# Patient Record
Sex: FEMALE | Race: WHITE | Employment: OTHER | URBAN - METROPOLITAN AREA
[De-identification: names, ages, dates, MRNs, and addresses within clinical notes are randomized per-mention and may not be internally consistent; named-entity substitution may affect disease eponyms.]

---

## 2019-12-06 ENCOUNTER — HOSPITAL ENCOUNTER (OUTPATIENT)
Age: 74
Setting detail: OBSERVATION
Discharge: HOME | End: 2019-12-06
Attending: EMERGENCY MEDICINE | Admitting: INTERNAL MEDICINE
Payer: MEDICARE

## 2019-12-06 ENCOUNTER — APPOINTMENT (OUTPATIENT)
Dept: CT IMAGING | Age: 74
End: 2019-12-06
Attending: EMERGENCY MEDICINE
Payer: MEDICARE

## 2019-12-06 ENCOUNTER — APPOINTMENT (OUTPATIENT)
Dept: GENERAL RADIOLOGY | Age: 74
End: 2019-12-06
Attending: EMERGENCY MEDICINE
Payer: MEDICARE

## 2019-12-06 VITALS
OXYGEN SATURATION: 99 % | TEMPERATURE: 97.9 F | HEART RATE: 76 BPM | RESPIRATION RATE: 16 BRPM | SYSTOLIC BLOOD PRESSURE: 147 MMHG | DIASTOLIC BLOOD PRESSURE: 64 MMHG

## 2019-12-06 DIAGNOSIS — F03.90 DEMENTIA WITHOUT BEHAVIORAL DISTURBANCE, UNSPECIFIED DEMENTIA TYPE: Primary | ICD-10-CM

## 2019-12-06 PROBLEM — R41.82 ALTERED MENTAL STATUS: Status: ACTIVE | Noted: 2019-12-06

## 2019-12-06 LAB
ALBUMIN SERPL-MCNC: 3.7 G/DL (ref 3.5–5)
ALBUMIN/GLOB SERPL: 1.3 {RATIO} (ref 1.1–2.2)
ALP SERPL-CCNC: 68 U/L (ref 45–117)
ALT SERPL-CCNC: 17 U/L (ref 12–78)
ANION GAP SERPL CALC-SCNC: 5 MMOL/L (ref 5–15)
APTT PPP: 25.1 SEC (ref 22.1–32)
AST SERPL-CCNC: 14 U/L (ref 15–37)
ATRIAL RATE: 67 BPM
BASOPHILS # BLD: 0 K/UL (ref 0–0.1)
BASOPHILS NFR BLD: 0 % (ref 0–1)
BILIRUB SERPL-MCNC: 0.8 MG/DL (ref 0.2–1)
BUN SERPL-MCNC: 16 MG/DL (ref 6–20)
BUN/CREAT SERPL: 20 (ref 12–20)
CALCIUM SERPL-MCNC: 9 MG/DL (ref 8.5–10.1)
CALCULATED P AXIS, ECG09: 20 DEGREES
CALCULATED R AXIS, ECG10: 51 DEGREES
CALCULATED T AXIS, ECG11: 45 DEGREES
CHLORIDE SERPL-SCNC: 109 MMOL/L (ref 97–108)
CO2 SERPL-SCNC: 28 MMOL/L (ref 21–32)
COMMENT, HOLDF: NORMAL
CREAT SERPL-MCNC: 0.79 MG/DL (ref 0.55–1.02)
DIAGNOSIS, 93000: NORMAL
DIFFERENTIAL METHOD BLD: NORMAL
EOSINOPHIL # BLD: 0 K/UL (ref 0–0.4)
EOSINOPHIL NFR BLD: 0 % (ref 0–7)
ERYTHROCYTE [DISTWIDTH] IN BLOOD BY AUTOMATED COUNT: 12.1 % (ref 11.5–14.5)
GLOBULIN SER CALC-MCNC: 2.8 G/DL (ref 2–4)
GLUCOSE SERPL-MCNC: 93 MG/DL (ref 65–100)
HCT VFR BLD AUTO: 38.1 % (ref 35–47)
HGB BLD-MCNC: 12.2 G/DL (ref 11.5–16)
IMM GRANULOCYTES # BLD AUTO: 0 K/UL (ref 0–0.04)
IMM GRANULOCYTES NFR BLD AUTO: 0 % (ref 0–0.5)
INR PPP: 1.1 (ref 0.9–1.1)
LYMPHOCYTES # BLD: 1.1 K/UL (ref 0.8–3.5)
LYMPHOCYTES NFR BLD: 20 % (ref 12–49)
MAGNESIUM SERPL-MCNC: 2.3 MG/DL (ref 1.6–2.4)
MCH RBC QN AUTO: 30 PG (ref 26–34)
MCHC RBC AUTO-ENTMCNC: 32 G/DL (ref 30–36.5)
MCV RBC AUTO: 93.8 FL (ref 80–99)
MONOCYTES # BLD: 0.3 K/UL (ref 0–1)
MONOCYTES NFR BLD: 6 % (ref 5–13)
NEUTS SEG # BLD: 3.9 K/UL (ref 1.8–8)
NEUTS SEG NFR BLD: 74 % (ref 32–75)
NRBC # BLD: 0 K/UL (ref 0–0.01)
NRBC BLD-RTO: 0 PER 100 WBC
P-R INTERVAL, ECG05: 144 MS
PLATELET # BLD AUTO: 166 K/UL (ref 150–400)
PMV BLD AUTO: 10.3 FL (ref 8.9–12.9)
POTASSIUM SERPL-SCNC: 3.4 MMOL/L (ref 3.5–5.1)
PROT SERPL-MCNC: 6.5 G/DL (ref 6.4–8.2)
PROTHROMBIN TIME: 11.2 SEC (ref 9–11.1)
Q-T INTERVAL, ECG07: 408 MS
QRS DURATION, ECG06: 86 MS
QTC CALCULATION (BEZET), ECG08: 431 MS
RBC # BLD AUTO: 4.06 M/UL (ref 3.8–5.2)
SAMPLES BEING HELD,HOLD: NORMAL
SODIUM SERPL-SCNC: 142 MMOL/L (ref 136–145)
THERAPEUTIC RANGE,PTTT: NORMAL SECS (ref 58–77)
VENTRICULAR RATE, ECG03: 67 BPM
WBC # BLD AUTO: 5.4 K/UL (ref 3.6–11)

## 2019-12-06 PROCEDURE — 36415 COLL VENOUS BLD VENIPUNCTURE: CPT

## 2019-12-06 PROCEDURE — 74011250637 HC RX REV CODE- 250/637: Performed by: INTERNAL MEDICINE

## 2019-12-06 PROCEDURE — 85610 PROTHROMBIN TIME: CPT

## 2019-12-06 PROCEDURE — 85025 COMPLETE CBC W/AUTO DIFF WBC: CPT

## 2019-12-06 PROCEDURE — 99285 EMERGENCY DEPT VISIT HI MDM: CPT

## 2019-12-06 PROCEDURE — 80053 COMPREHEN METABOLIC PANEL: CPT

## 2019-12-06 PROCEDURE — 93005 ELECTROCARDIOGRAM TRACING: CPT

## 2019-12-06 PROCEDURE — 85730 THROMBOPLASTIN TIME PARTIAL: CPT

## 2019-12-06 PROCEDURE — 70450 CT HEAD/BRAIN W/O DYE: CPT

## 2019-12-06 PROCEDURE — 99218 HC RM OBSERVATION: CPT

## 2019-12-06 PROCEDURE — 71045 X-RAY EXAM CHEST 1 VIEW: CPT

## 2019-12-06 PROCEDURE — 83735 ASSAY OF MAGNESIUM: CPT

## 2019-12-06 RX ORDER — POTASSIUM CHLORIDE 750 MG/1
40 TABLET, FILM COATED, EXTENDED RELEASE ORAL
Status: COMPLETED | OUTPATIENT
Start: 2019-12-06 | End: 2019-12-06

## 2019-12-06 RX ORDER — AMLODIPINE BESYLATE 5 MG/1
5 TABLET ORAL DAILY
COMMUNITY
End: 2020-03-02

## 2019-12-06 RX ORDER — HYDROCHLOROTHIAZIDE 12.5 MG/1
12.5 TABLET ORAL DAILY
COMMUNITY
End: 2020-03-02

## 2019-12-06 RX ADMIN — POTASSIUM CHLORIDE 40 MEQ: 750 TABLET, FILM COATED, EXTENDED RELEASE ORAL at 17:16

## 2019-12-06 NOTE — ED TRIAGE NOTES
Per EMS pt was at [facility] and forgot her wallet. Pt went to car the get wallet and returned with a pair of gloves in attempt to pay . Staff at [facility] reported to EMS that pt is a regular in the store and they have noticed a decline in her mental status over the past 2 weeks.

## 2019-12-06 NOTE — H&P
[address] [facility] Adult  Hospitalist Group  Hospitalist Consult    Primary Care Provider: Other, MD Lucas    Subjective:     Ms. Nghia Klein is a 69y sweet lady with pertinent medical history of hypertension only, taking 2 medications per her POA, Ms. Elizabeth Sun, who lives in the very same apartment complex as patient. Patient is alert, awake but completely confused. He is able to carry out a conversation in a non-meaningful way. Would answer the question in a nonhelpful way. Patient has baseline dementia and has a lot of support from neighborhood. At baseline patient is able to walk independently. She walks to [employer] regularly which is very close to her house, and does her groceries by herself. Patient does not drive for the last 8-4/1-TARU due to field test with her cataracts. Patient gets confused easily if she is in unfamiliar environment as per the POA. Today patient was at [facility]. When she went to check out counter, the regular person was on break. Patient went to self checkout but then another person helping her over there when asked about money, patient handed her gloves. Then patient went back to her apartment to get her wallet, but according to [facility] staff, it was a basket and a hat. To the regular staff, she seemed more confused so they called EMS who brought patient to emergency department for further evaluation. In the ED patient has been evaluated with previous diagnostic tests. Her CBC is within normal limits. K is 3.4 but rest of the CMP is within normal limits. CT head without contrast and x-ray chest also are unremarkable. UA is pending at present. ED physician asked us to evaluate the patient for further recommendation and consideration for admission. POA, she has noticed gradual worsening of her confusion over a period of 2-4 weeks more so pronounced than before. This is started about at the beginning of fallSeptember.   Since then patient has lost about 10-15 pounds weight as well. Her appetite is okay but diet is reduced. No chest pain, shortness of breath, dizziness, lightheadedness, palpitation, abdominal pain, abdominal distention, nausea, vomiting, leg swelling, orthopnea, PND, focal neurological deficit or any other symptoms. No blood in stool or urine. Pt is not able to contribute to her history details as any points. Review of Systems:    A comprehensive review of systems was negative except for that written in the History of Present Illness. No past medical history on file. Per POA, HTN and Dementia only  No past surgical history on file. Prior to Admission medications    Not on File   Takes 2 medications for her HTN. But POA does not have the list of meds. Allergies no known allergies   No family history on file. POA denied any cardiac history to her knowledge. Pt is confused and not able to contribute any history details. SOCIAL HISTORY:  Patient resides at Home. Patient ambulates with no supportindependently. Smoking history: never  Alcohol history: None      Objective:       Physical Exam:   Visit Vitals  /64   Pulse 76   Temp 97.9 °F (36.6 °C)   Resp 16   SpO2 99%     General:  Alert, cooperative, no distress, appears stated age. Baseline dementia. Thin cachectic habitus   Head:  Normocephalic, without obvious abnormality, atraumatic. Eyes:  Conjunctivae/corneas clear. PERRL, EOMs intact. Ears:  Normal TMs and external ear canals both ears. Nose: Nares normal. Septum midline. Mucosa normal. No drainage or sinus tenderness. Throat: Lips, mucosa, and tongue normal. Teeth and gums normal.   Neck: Supple, symmetrical, trachea midline, no adenopathy, thyroid: no enlargement/tenderness/nodules, no carotid bruit and no JVD. Back:   Symmetric, no curvature. ROM normal. No CVA tenderness. Lungs:   Clear to auscultation bilaterally. Heart:  Regular rate and rhythm, S1, S2 normal, no murmur, click, rub or gallop.    Abdomen: Soft, non-tender. Bowel sounds normal. No masses,  No organomegaly. Extremities: Extremities normal, atraumatic, no cyanosis or edema. Pulses: 2+ and symmetric all extremities. Skin: Skin color, texture, turgor normal. No rashes or lesions. Lymph nodes: Cervical, supraclavicular, and axillary nodes normal.   Neurologic: CNII-XII intact. Normal strength, sensation and reflexes throughout. Pt is alert, awake, confused completely. Oriented to self but not to time, place, persons. Recognizes POA but could not tell me her name. No focal neurological deficits on my limited exam given limited cooperation from this pleasant patient. ECG:  normal EKG, normal sinus rhythm, unchanged from previous tracings     Data Review: All diagnostic labs and studies have been reviewed. Chest x-ray was negative for infiltrate, effusion, pneumothorax, or wide mediastinum. Ct Head Wo Cont    Result Date: 12/6/2019  IMPRESSION: Mild small vessel ischemic changes. No acute abnormality. Xr Chest Port    Result Date: 12/6/2019  IMPRESSION: No acute cardiopulmonary disease. Recent Results (from the past 24 hour(s))   CBC WITH AUTOMATED DIFF    Collection Time: 12/06/19 12:33 PM   Result Value Ref Range    WBC 5.4 3.6 - 11.0 K/uL    RBC 4.06 3.80 - 5.20 M/uL    HGB 12.2 11.5 - 16.0 g/dL    HCT 38.1 35.0 - 47.0 %    MCV 93.8 80.0 - 99.0 FL    MCH 30.0 26.0 - 34.0 PG    MCHC 32.0 30.0 - 36.5 g/dL    RDW 12.1 11.5 - 14.5 %    PLATELET 096 307 - 778 K/uL    MPV 10.3 8.9 - 12.9 FL    NRBC 0.0 0  WBC    ABSOLUTE NRBC 0.00 0.00 - 0.01 K/uL    NEUTROPHILS 74 32 - 75 %    LYMPHOCYTES 20 12 - 49 %    MONOCYTES 6 5 - 13 %    EOSINOPHILS 0 0 - 7 %    BASOPHILS 0 0 - 1 %    IMMATURE GRANULOCYTES 0 0.0 - 0.5 %    ABS. NEUTROPHILS 3.9 1.8 - 8.0 K/UL    ABS. LYMPHOCYTES 1.1 0.8 - 3.5 K/UL    ABS. MONOCYTES 0.3 0.0 - 1.0 K/UL    ABS. EOSINOPHILS 0.0 0.0 - 0.4 K/UL    ABS. BASOPHILS 0.0 0.0 - 0.1 K/UL    ABS. IMM.  GRANS. 0.0 0.00 - 0.04 K/UL    DF AUTOMATED     METABOLIC PANEL, COMPREHENSIVE    Collection Time: 12/06/19 12:33 PM   Result Value Ref Range    Sodium 142 136 - 145 mmol/L    Potassium 3.4 (L) 3.5 - 5.1 mmol/L    Chloride 109 (H) 97 - 108 mmol/L    CO2 28 21 - 32 mmol/L    Anion gap 5 5 - 15 mmol/L    Glucose 93 65 - 100 mg/dL    BUN 16 6 - 20 MG/DL    Creatinine 0.79 0.55 - 1.02 MG/DL    BUN/Creatinine ratio 20 12 - 20      GFR est AA >60 >60 ml/min/1.73m2    GFR est non-AA >60 >60 ml/min/1.73m2    Calcium 9.0 8.5 - 10.1 MG/DL    Bilirubin, total 0.8 0.2 - 1.0 MG/DL    ALT (SGPT) 17 12 - 78 U/L    AST (SGOT) 14 (L) 15 - 37 U/L    Alk. phosphatase 68 45 - 117 U/L    Protein, total 6.5 6.4 - 8.2 g/dL    Albumin 3.7 3.5 - 5.0 g/dL    Globulin 2.8 2.0 - 4.0 g/dL    A-G Ratio 1.3 1.1 - 2.2     PROTHROMBIN TIME + INR    Collection Time: 12/06/19 12:33 PM   Result Value Ref Range    INR 1.1 0.9 - 1.1      Prothrombin time 11.2 (H) 9.0 - 11.1 sec   PTT    Collection Time: 12/06/19 12:33 PM   Result Value Ref Range    aPTT 25.1 22.1 - 32.0 sec    aPTT, therapeutic range     58.0 - 77.0 SECS   SAMPLES BEING HELD    Collection Time: 12/06/19 12:33 PM   Result Value Ref Range    SAMPLES BEING HELD 1RD     COMMENT        Add-on orders for these samples will be processed based on acceptable specimen integrity and analyte stability, which may vary by analyte. EKG, 12 LEAD, INITIAL    Collection Time: 12/06/19 12:39 PM   Result Value Ref Range    Ventricular Rate 67 BPM    Atrial Rate 67 BPM    P-R Interval 144 ms    QRS Duration 86 ms    Q-T Interval 408 ms    QTC Calculation (Bezet) 431 ms    Calculated P Axis 20 degrees    Calculated R Axis 51 degrees    Calculated T Axis 45 degrees    Diagnosis Normal sinus rhythm  No previous ECGs available        Assessment:     Active Problems:    Altered mental status (12/6/2019)    # Altered mental status. Gradual worsening of dementia mostly.  In light of her normal work up, at present, I would recommend to get Senior services consult in the ED to assist further with care of this pt. - Pt would benefit from outpatient neurology evaluation and consideration for medication for dementia, other supportive care and placement to memory care unit/ assisted living/ nursing home. Given she is mobile and independent physically, assisted living could be a possible option.  - On discharge from ED, I would recommend to get PCP f/u on Monday to  and follow up on recommendations that can be provided to the patient during this evaluation.  - If for patient safety reasons, or with pending UA suggesting UTI or if anything changes, requiring patient to be in the hospital under observation, feel free to contact me to assist further with the care of this pt. - If hospitalized, I would consider USD, Ammonia level, Neurology consultation and PT/OT eval based on further clinical reassessment and needs. # HTN: Vitals stable currently. Pt has not taken her AM meds per POA. Pharmacy assistance to verify her medications. # Severe protein calorie malnutrition: I would recommend to get outpatient nutrition consult along with nutritional shakes supplementations 3 times daily with meals. If hospitalized, will consult nutrition team.    # Hypokalemia: Recommend to replenish potassium with KCl 40 M EQ p.o. once in the ED and add on magnesium level. Thank you for allowing me to be part of care of this wonderful patient, Ms. Cartwright. Feel free to contact me if any concerns or questions. Plan:     As noted above    FUNCTIONAL STATUS PRIOR TO HOSPITALIZATION (including history of recent falls):  Independent from Home     Signed By: Leodan Romero MD     December 6, 2019

## 2019-12-06 NOTE — PROGRESS NOTES
Admission Medication Reconciliation:    Information obtained from:  Family member, [facility] pharmacy    Comments/Recommendations: Reviewed PTA medications and patient's allergies. Patient currently altered so unable to provide medication history. Per family member present, she is \"supposed to be taking\" two blood pressure medications. Called [facility] pharmacy to obtain information. Added amlodipine, HCTZ to PTA med list.   [facility] benefit data reflects medications filled and processed through the patient's insurance, however   this data does NOT capture whether the medication was picked up or is currently being taken by the patient. Allergies:  Patient has no known allergies. Significant PMH/Disease States: No past medical history on file. Chief Complaint for this Admission:    Chief Complaint   Patient presents with    Altered mental status     Prior to Admission Medications:   Prior to Admission Medications   Prescriptions Last Dose Informant Patient Reported? Taking? amLODIPine (NORVASC) 5 mg tablet   Yes Yes   Sig: Take 5 mg by mouth daily. hydroCHLOROthiazide (HYDRODIURIL) 12.5 mg tablet   Yes Yes   Sig: Take 12.5 mg by mouth daily.       Facility-Administered Medications: None

## 2019-12-06 NOTE — PROGRESS NOTES
SSED/CM consult received and appreciated. SBAR received from [facility] and case discussed w/ Jenny Bateman. CMs met w/patient and POA Zabrina Shea - both have know each other for 2 years. Ms. Stacy Patterson lives 3 buildings from Ms. Gretchen Hernandez and has been POA for 1 month. POA has attempted to reach out to patient's sister in [place] however has not been active w/ planning processes. CM discussed Certified Senior Advisors programs including [facility], [address] [place] [address] for Mom and [facility]. POA receptive and in agreement with plan for patient to return back home w/ POA and community support (patient has a network of friends and apartment management) who call and visit regularly. Informed POA of the need to supplement w/ private duty care and list provided.  to travel out of town tomorrow and will have supports in place. Informed Ms. Gretchen Hernandez has [employer]" her [place] . Patient is pleasant and conversational. [place] expressed appreciation for visit today. No additional transitional care needs verbalized. Updates provided to Jenny Bateman and Markham Castleman. Transportation home by [place].

## 2019-12-06 NOTE — PROGRESS NOTES
Observation notice provided in writing to patient and/or caregiver as well as verbal explanation of the policy. Patients who are in outpatient status also receive the Observation notice. Osiel Gracia was also given and signed by [place]. Reason for Admission:   Decline in Mental Status                   RRAT Score:          4/ low           Plan for utilizing home health:      TBD/ CM will need to follow                    Current Advanced Directive/Advance Care Plan: not on file                         Transition of Care Plan: This CM went in to give [place] and OBS notice. Per POA Soren Parikh [phone] patient live alone in an apartment and over the past week has had significant decline in mental status. This CM was not able to ask patient any questions to patient and have patient respond appropriately. Per POA, patient has a Trust that she can access through patient's . Patient receives 800 dollars a month and her trust pays for her other living expenses. Patient's current address is [address] [place] [address]. Per POA, she checks on patient daily and also transports her to appointments. POA also mentioned that patient had a fall in September of 2019 that was evaluated at [facility] to follow up with PCP. Senior Services is to consult. PO is willing to take patient home and provide support, but feels patient may need placement at this point. CM will need to follow. Care Management Interventions  PCP Verified by CM:  Yes  Palliative Care Criteria Met (RRAT>21 & CHF Dx)?: No  Transition of Care Consult (CM Consult): (TBD)  MyChart Signup: No(pending)  Discharge Durable Medical Equipment: No  Health Maintenance Reviewed: Yes  Physical Therapy Consult: No  Occupational Therapy Consult: No  Speech Therapy Consult: No  Current Support Network: (lives in an apartment)  Confirm Follow Up Transport: Friends  Plan discussed with Pt/Family/Caregiver: Yes  Discharge Location  Discharge Placement: (TBD)  Pauly Dao  3:36 PM

## 2019-12-06 NOTE — ED PROVIDER NOTES
76 y.o. female with no significant past medical history who presents from [facility] via EMS with chief complaint of AMS. Pt presents from [facility] via EMS due to staff reports of altered mental status. Per EMS, the pt was grocery shopping and trying to pay, but stated she forgot her wallet in the car. Staff stated the pt returned to the check out area with a basket and gloves to pay with. Staff told EMS that they have a noticed a gradual decline in mental status over the past 2 weeks. Pt states she ambulates without assistance at baseline. Pt is not sure where she lives. Pt denies pain, fever, chills, shortness of breath, headache, nausea, vomiting, abdominal pain, constipation, or dysuria. There are no other acute medical concerns at this time. Full history, physical exam, and ROS unable to be obtained due to:  confusion. Note written by Anjelica Starkey. Debby Jaquez, as dictated by Des Zhong MD 11:05 AM      The history is provided by the patient and the EMS personnel. No past medical history on file. No past surgical history on file. No family history on file.     Social History     Socioeconomic History    Marital status:      Spouse name: Not on file    Number of children: Not on file    Years of education: Not on file    Highest education level: Not on file   Occupational History    Not on file   Social Needs    Financial resource strain: Not on file    Food insecurity:     Worry: Not on file     Inability: Not on file    Transportation needs:     Medical: Not on file     Non-medical: Not on file   Tobacco Use    Smoking status: Not on file   Substance and Sexual Activity    Alcohol use: Not on file    Drug use: Not on file    Sexual activity: Not on file   Lifestyle    Physical activity:     Days per week: Not on file     Minutes per session: Not on file    Stress: Not on file   Relationships    Social connections:     Talks on phone: Not on file     Gets together: Not on file     Attends Catholic service: Not on file     Active member of club or organization: Not on file     Attends meetings of clubs or organizations: Not on file     Relationship status: Not on file    Intimate partner violence:     Fear of current or ex partner: Not on file     Emotionally abused: Not on file     Physically abused: Not on file     Forced sexual activity: Not on file   Other Topics Concern    Not on file   Social History Narrative    Not on file         ALLERGIES: Patient has no known allergies. Review of Systems   Unable to perform ROS: Mental status change       Vitals:    12/06/19 1104   BP: 138/77   Pulse: 76   Resp: 16   Temp: 97.9 °F (36.6 °C)   SpO2: 98%            Physical Exam  Vitals signs and nursing note reviewed. Constitutional:       General: She is not in acute distress. Appearance: She is well-developed. HENT:      Head: Normocephalic and atraumatic. Nose: Nose normal.   Eyes:      General: No scleral icterus. Conjunctiva/sclera: Conjunctivae normal.      Pupils: Pupils are equal, round, and reactive to light. Neck:      Musculoskeletal: Normal range of motion and neck supple. Thyroid: No thyromegaly. Vascular: No JVD. Trachea: No tracheal deviation. Comments: No carotid bruits noted. Cardiovascular:      Rate and Rhythm: Normal rate and regular rhythm. Heart sounds: Normal heart sounds. No murmur. No friction rub. No gallop. Pulmonary:      Effort: Pulmonary effort is normal. No respiratory distress. Breath sounds: Normal breath sounds. No wheezing or rales. Chest:      Chest wall: No tenderness. Abdominal:      General: Bowel sounds are normal. There is no distension. Palpations: Abdomen is soft. There is no mass. Tenderness: There is no tenderness. There is no guarding or rebound. Musculoskeletal: Normal range of motion. General: No tenderness.    Lymphadenopathy:      Cervical: No cervical adenopathy. Skin:     General: Skin is warm and dry. Findings: No erythema or rash. Neurological:      General: No focal deficit present. Mental Status: She is alert. Cranial Nerves: No cranial nerve deficit. Coordination: Coordination normal.      Deep Tendon Reflexes: Reflexes are normal and symmetric. Comments: Confused. Does not know the year. Does not know where she lives. Global expressive aphasia. Psychiatric:      Comments: Unable to determine as patient is confused/demented. Note written by Galileo Honeycutt. Karsten Leventhal, as dictated by Gilbert Joe MD 11:08 AM      MDM  Number of Diagnoses or Management Options     Amount and/or Complexity of Data Reviewed  Clinical lab tests: ordered and reviewed  Tests in the radiology section of CPT®: ordered and reviewed  Decide to obtain previous medical records or to obtain history from someone other than the patient: yes  Review and summarize past medical records: yes  Discuss the patient with other providers: yes  Independent visualization of images, tracings, or specimens: yes    Risk of Complications, Morbidity, and/or Mortality  Presenting problems: high  Diagnostic procedures: high  Management options: high    Critical Care  Total time providing critical care: 30-74 minutes         Procedures    ED MD EKG interpretation : NSR with rate 67 BPM. No ectopy noted. Axis is normal. Intervals are normal. No ectopy or acute changes noted. Penny Schultz MD      Awaiting labs 12:52 PM     1:23 PM   The patient's lab and scan are unremarkable. She will require admission for further evaluation of either strokelike syndrome or dementia. Consult the hospitalist for admission. She is not safe to return home at this time.     Hospitalist Segundo Text for Admission  1:25 PM    ED Room Number: ER05/05  Patient Name and age:  Kyara Wilson 76 y.o.  female  Working Diagnosis: Altered mental status  Readmission: no  Isolation Requirements:  no  Recommended Level of Care:  med/surg  Code Status:  full  Other:    Department:[facility] - [facility]     The hospitalist is seen the patient and wants to hold off on admission until she has a value based management. I have expressed my concern that she is not in any shape to go home by herself. She does not know the day or the year or even where she is. Power of  lives in a separate apartment. Case management is asked to for appropriateness of discharge. Case management is worked with the power of . She will assure that someone is with the patient during the course of the weekend. They are working on the potential placement of this patient and a nursing home. It is felt that with the assurances of the power of , it is reasonable to let this patient go home into a more control safe environment until such a time as they can get her placed. The patient has been up and ambulatory in the ED and in no acute distress.

## 2020-02-27 ENCOUNTER — APPOINTMENT (OUTPATIENT)
Dept: NON INVASIVE DIAGNOSTICS | Age: 75
DRG: 689 | End: 2020-02-27
Attending: INTERNAL MEDICINE
Payer: MEDICARE

## 2020-02-27 ENCOUNTER — APPOINTMENT (OUTPATIENT)
Dept: CT IMAGING | Age: 75
DRG: 689 | End: 2020-02-27
Attending: EMERGENCY MEDICINE
Payer: MEDICARE

## 2020-02-27 ENCOUNTER — HOSPITAL ENCOUNTER (INPATIENT)
Age: 75
LOS: 4 days | Discharge: NURSING, REHAB OR LONG-TERM CARE | DRG: 689 | End: 2020-03-02
Attending: EMERGENCY MEDICINE | Admitting: INTERNAL MEDICINE
Payer: MEDICARE

## 2020-02-27 DIAGNOSIS — I63.9 CEREBROVASCULAR ACCIDENT (CVA), UNSPECIFIED MECHANISM (HCC): Primary | ICD-10-CM

## 2020-02-27 DIAGNOSIS — R41.82 ALTERED MENTAL STATUS, UNSPECIFIED ALTERED MENTAL STATUS TYPE: ICD-10-CM

## 2020-02-27 LAB
ALBUMIN SERPL-MCNC: 3.4 G/DL (ref 3.5–5)
ALBUMIN/GLOB SERPL: 1 {RATIO} (ref 1.1–2.2)
ALP SERPL-CCNC: 79 U/L (ref 45–117)
ALT SERPL-CCNC: 21 U/L (ref 12–78)
ANION GAP SERPL CALC-SCNC: 7 MMOL/L (ref 5–15)
APPEARANCE UR: CLEAR
APTT PPP: 25 SEC (ref 22.1–32)
AST SERPL-CCNC: 23 U/L (ref 15–37)
BACTERIA URNS QL MICRO: NEGATIVE /HPF
BASOPHILS # BLD: 0 K/UL (ref 0–0.1)
BASOPHILS NFR BLD: 0 % (ref 0–1)
BILIRUB SERPL-MCNC: 0.9 MG/DL (ref 0.2–1)
BILIRUB UR QL: NEGATIVE
BUN SERPL-MCNC: 14 MG/DL (ref 6–20)
BUN/CREAT SERPL: 22 (ref 12–20)
CALCIUM SERPL-MCNC: 8.1 MG/DL (ref 8.5–10.1)
CHLORIDE SERPL-SCNC: 96 MMOL/L (ref 97–108)
CO2 SERPL-SCNC: 26 MMOL/L (ref 21–32)
COLOR UR: ABNORMAL
COMMENT, HOLDF: NORMAL
CREAT SERPL-MCNC: 0.63 MG/DL (ref 0.55–1.02)
DIFFERENTIAL METHOD BLD: ABNORMAL
EOSINOPHIL # BLD: 0 K/UL (ref 0–0.4)
EOSINOPHIL NFR BLD: 0 % (ref 0–7)
EPITH CASTS URNS QL MICRO: ABNORMAL /LPF
ERYTHROCYTE [DISTWIDTH] IN BLOOD BY AUTOMATED COUNT: 11.9 % (ref 11.5–14.5)
GLOBULIN SER CALC-MCNC: 3.3 G/DL (ref 2–4)
GLUCOSE BLD STRIP.AUTO-MCNC: 94 MG/DL (ref 65–100)
GLUCOSE SERPL-MCNC: 102 MG/DL (ref 65–100)
GLUCOSE UR STRIP.AUTO-MCNC: NEGATIVE MG/DL
HCT VFR BLD AUTO: 38.4 % (ref 35–47)
HGB BLD-MCNC: 13 G/DL (ref 11.5–16)
HGB UR QL STRIP: ABNORMAL
HYALINE CASTS URNS QL MICRO: ABNORMAL /LPF (ref 0–5)
IMM GRANULOCYTES # BLD AUTO: 0 K/UL (ref 0–0.04)
IMM GRANULOCYTES NFR BLD AUTO: 0 % (ref 0–0.5)
INR PPP: 1.1 (ref 0.9–1.1)
KETONES UR QL STRIP.AUTO: 15 MG/DL
LEUKOCYTE ESTERASE UR QL STRIP.AUTO: NEGATIVE
LYMPHOCYTES # BLD: 0.8 K/UL (ref 0.8–3.5)
LYMPHOCYTES NFR BLD: 15 % (ref 12–49)
MCH RBC QN AUTO: 30.8 PG (ref 26–34)
MCHC RBC AUTO-ENTMCNC: 33.9 G/DL (ref 30–36.5)
MCV RBC AUTO: 91 FL (ref 80–99)
MONOCYTES # BLD: 0.5 K/UL (ref 0–1)
MONOCYTES NFR BLD: 9 % (ref 5–13)
NEUTS SEG # BLD: 4.1 K/UL (ref 1.8–8)
NEUTS SEG NFR BLD: 76 % (ref 32–75)
NITRITE UR QL STRIP.AUTO: NEGATIVE
NRBC # BLD: 0 K/UL (ref 0–0.01)
NRBC BLD-RTO: 0 PER 100 WBC
PH UR STRIP: 6.5 [PH] (ref 5–8)
PLATELET # BLD AUTO: 162 K/UL (ref 150–400)
PMV BLD AUTO: 10.2 FL (ref 8.9–12.9)
POTASSIUM SERPL-SCNC: 3.3 MMOL/L (ref 3.5–5.1)
PROT SERPL-MCNC: 6.7 G/DL (ref 6.4–8.2)
PROT UR STRIP-MCNC: NEGATIVE MG/DL
PROTHROMBIN TIME: 11.4 SEC (ref 9–11.1)
RBC # BLD AUTO: 4.22 M/UL (ref 3.8–5.2)
RBC #/AREA URNS HPF: ABNORMAL /HPF (ref 0–5)
RBC MORPH BLD: ABNORMAL
SAMPLES BEING HELD,HOLD: NORMAL
SERVICE CMNT-IMP: NORMAL
SODIUM SERPL-SCNC: 129 MMOL/L (ref 136–145)
SP GR UR REFRACTOMETRY: 1.02 (ref 1–1.03)
THERAPEUTIC RANGE,PTTT: NORMAL SECS (ref 58–77)
TROPONIN I SERPL-MCNC: <0.05 NG/ML
TROPONIN I SERPL-MCNC: <0.05 NG/ML
UROBILINOGEN UR QL STRIP.AUTO: 0.2 EU/DL (ref 0.2–1)
WBC # BLD AUTO: 5.4 K/UL (ref 3.6–11)
WBC URNS QL MICRO: ABNORMAL /HPF (ref 0–4)

## 2020-02-27 PROCEDURE — 82962 GLUCOSE BLOOD TEST: CPT

## 2020-02-27 PROCEDURE — 0042T CT CODE NEURO PERF W CBF: CPT

## 2020-02-27 PROCEDURE — 84484 ASSAY OF TROPONIN QUANT: CPT

## 2020-02-27 PROCEDURE — 36415 COLL VENOUS BLD VENIPUNCTURE: CPT

## 2020-02-27 PROCEDURE — 74011250636 HC RX REV CODE- 250/636: Performed by: INTERNAL MEDICINE

## 2020-02-27 PROCEDURE — 85025 COMPLETE CBC W/AUTO DIFF WBC: CPT

## 2020-02-27 PROCEDURE — 93005 ELECTROCARDIOGRAM TRACING: CPT

## 2020-02-27 PROCEDURE — 80053 COMPREHEN METABOLIC PANEL: CPT

## 2020-02-27 PROCEDURE — 81001 URINALYSIS AUTO W/SCOPE: CPT

## 2020-02-27 PROCEDURE — 65660000000 HC RM CCU STEPDOWN

## 2020-02-27 PROCEDURE — 74011000258 HC RX REV CODE- 258: Performed by: RADIOLOGY

## 2020-02-27 PROCEDURE — 85730 THROMBOPLASTIN TIME PARTIAL: CPT

## 2020-02-27 PROCEDURE — 85610 PROTHROMBIN TIME: CPT

## 2020-02-27 PROCEDURE — 74011250637 HC RX REV CODE- 250/637: Performed by: INTERNAL MEDICINE

## 2020-02-27 PROCEDURE — C8929 TTE W OR WO FOL WCON,DOPPLER: HCPCS

## 2020-02-27 PROCEDURE — 70496 CT ANGIOGRAPHY HEAD: CPT

## 2020-02-27 PROCEDURE — 74011636320 HC RX REV CODE- 636/320: Performed by: RADIOLOGY

## 2020-02-27 PROCEDURE — 77030019905 HC CATH URETH INTMIT MDII -A

## 2020-02-27 PROCEDURE — 70450 CT HEAD/BRAIN W/O DYE: CPT

## 2020-02-27 PROCEDURE — 99285 EMERGENCY DEPT VISIT HI MDM: CPT

## 2020-02-27 RX ORDER — POTASSIUM CHLORIDE 7.45 MG/ML
10 INJECTION INTRAVENOUS
Status: DISCONTINUED | OUTPATIENT
Start: 2020-02-27 | End: 2020-02-27

## 2020-02-27 RX ORDER — SODIUM CHLORIDE 9 MG/ML
75 INJECTION, SOLUTION INTRAVENOUS CONTINUOUS
Status: DISPENSED | OUTPATIENT
Start: 2020-02-27 | End: 2020-02-28

## 2020-02-27 RX ORDER — SODIUM CHLORIDE 0.9 % (FLUSH) 0.9 %
5-40 SYRINGE (ML) INJECTION EVERY 8 HOURS
Status: DISCONTINUED | OUTPATIENT
Start: 2020-02-27 | End: 2020-03-02 | Stop reason: HOSPADM

## 2020-02-27 RX ORDER — HEPARIN SODIUM 5000 [USP'U]/ML
5000 INJECTION, SOLUTION INTRAVENOUS; SUBCUTANEOUS EVERY 8 HOURS
Status: DISCONTINUED | OUTPATIENT
Start: 2020-02-27 | End: 2020-03-02 | Stop reason: HOSPADM

## 2020-02-27 RX ORDER — SODIUM CHLORIDE 0.9 % (FLUSH) 0.9 %
5-40 SYRINGE (ML) INJECTION AS NEEDED
Status: DISCONTINUED | OUTPATIENT
Start: 2020-02-27 | End: 2020-03-02 | Stop reason: HOSPADM

## 2020-02-27 RX ORDER — HALOPERIDOL 5 MG/ML
2 INJECTION INTRAMUSCULAR
Status: COMPLETED | OUTPATIENT
Start: 2020-02-27 | End: 2020-02-27

## 2020-02-27 RX ORDER — ONDANSETRON 2 MG/ML
4 INJECTION INTRAMUSCULAR; INTRAVENOUS
Status: DISCONTINUED | OUTPATIENT
Start: 2020-02-27 | End: 2020-03-02 | Stop reason: HOSPADM

## 2020-02-27 RX ORDER — SODIUM CHLORIDE 0.9 % (FLUSH) 0.9 %
10 SYRINGE (ML) INJECTION
Status: COMPLETED | OUTPATIENT
Start: 2020-02-27 | End: 2020-02-27

## 2020-02-27 RX ORDER — ACETAMINOPHEN 325 MG/1
650 TABLET ORAL
Status: DISCONTINUED | OUTPATIENT
Start: 2020-02-27 | End: 2020-03-02 | Stop reason: HOSPADM

## 2020-02-27 RX ORDER — DOCUSATE SODIUM 100 MG/1
100 CAPSULE, LIQUID FILLED ORAL 2 TIMES DAILY
Status: DISCONTINUED | OUTPATIENT
Start: 2020-02-27 | End: 2020-03-02 | Stop reason: HOSPADM

## 2020-02-27 RX ADMIN — Medication 10 ML: at 14:39

## 2020-02-27 RX ADMIN — HALOPERIDOL LACTATE 2 MG: 5 INJECTION INTRAMUSCULAR at 19:27

## 2020-02-27 RX ADMIN — Medication 10 ML: at 22:00

## 2020-02-27 RX ADMIN — SODIUM CHLORIDE 100 ML: 900 INJECTION, SOLUTION INTRAVENOUS at 14:39

## 2020-02-27 RX ADMIN — POTASSIUM BICARBONATE 40 MEQ: 782 TABLET, EFFERVESCENT ORAL at 19:22

## 2020-02-27 RX ADMIN — HEPARIN SODIUM 5000 UNITS: 5000 INJECTION INTRAVENOUS; SUBCUTANEOUS at 17:18

## 2020-02-27 RX ADMIN — IOPAMIDOL 120 ML: 755 INJECTION, SOLUTION INTRAVENOUS at 14:39

## 2020-02-27 RX ADMIN — SODIUM CHLORIDE 75 ML/HR: 900 INJECTION, SOLUTION INTRAVENOUS at 20:39

## 2020-02-27 NOTE — ED NOTES
Pt found sitting on floor. Pt had no complaints. ER MD at bedside to evaluate pt. Hospitalist was paged.

## 2020-02-27 NOTE — ED NOTES
Bedside and Verbal shift change report given to [place] (oncoming nurse) by Matt Horowitz (offgoing nurse). Report included the following information SBAR, ED Summary, MAR and Recent Results. 1822: Dysphagia screening completed, pt able to tolerate applesauce. Per Meme Shay pt can take potassium PO with applesauce.

## 2020-02-27 NOTE — ED PROVIDER NOTES
This is a 57-year-old female with a history of dementia and lives in a home. She apparently fell around 8:00 and was last known well about 745 this morning. The staff and said that the patient normally is confused and does not make a lot of sense. They felt that after they found her on the floor she was a little more confused than her baseline which is bad past.  There was no overt evidence of any injury as result of the fall. EMS was called to bring the patient to the hospital for further evaluation. No other history is available from the home. The patient is not in any condition to provide any history. No past medical history on file. No past surgical history on file. No family history on file.     Social History     Socioeconomic History    Marital status:      Spouse name: Not on file    Number of children: Not on file    Years of education: Not on file    Highest education level: Not on file   Occupational History    Not on file   Social Needs    Financial resource strain: Not on file    Food insecurity:     Worry: Not on file     Inability: Not on file    Transportation needs:     Medical: Not on file     Non-medical: Not on file   Tobacco Use    Smoking status: Not on file   Substance and Sexual Activity    Alcohol use: Not on file    Drug use: Not on file    Sexual activity: Not on file   Lifestyle    Physical activity:     Days per week: Not on file     Minutes per session: Not on file    Stress: Not on file   Relationships    Social connections:     Talks on phone: Not on file     Gets together: Not on file     Attends Mandaen service: Not on file     Active member of club or organization: Not on file     Attends meetings of clubs or organizations: Not on file     Relationship status: Not on file    Intimate partner violence:     Fear of current or ex partner: Not on file     Emotionally abused: Not on file     Physically abused: Not on file     Forced sexual activity: Not on file   Other Topics Concern    Not on file   Social History Narrative    Not on file         ALLERGIES: Patient has no known allergies. Review of Systems   Unable to perform ROS: Mental status change   Skin: Negative for color change and rash. All other systems reviewed and are negative. Vitals:    02/27/20 1322   BP: 151/66   Pulse: 74   Resp: 17   Temp: 98.6 °F (37 °C)   SpO2: 96%   Weight: 46.5 kg (102 lb 8.2 oz)            Physical Exam  Vitals signs and nursing note reviewed. Constitutional:       General: She is in acute distress. Appearance: She is well-developed. She is ill-appearing. Comments: Patient has some garbled speech and not making sense. VS as noted. Moving all extremities, although right arm a bit ataxic. VS as noted. No overt signs of trauma. HENT:      Head: Normocephalic and atraumatic. Nose: Nose normal.   Eyes:      General: No scleral icterus. Conjunctiva/sclera: Conjunctivae normal.      Pupils: Pupils are equal, round, and reactive to light. Neck:      Musculoskeletal: Normal range of motion and neck supple. Thyroid: No thyromegaly. Vascular: No JVD. Trachea: No tracheal deviation. Comments: No carotid bruits noted. Cardiovascular:      Rate and Rhythm: Normal rate and regular rhythm. Heart sounds: Normal heart sounds. No murmur. No friction rub. No gallop. Pulmonary:      Effort: Pulmonary effort is normal. No respiratory distress. Breath sounds: Normal breath sounds. No wheezing or rales. Chest:      Chest wall: No tenderness. Abdominal:      General: Bowel sounds are normal. There is no distension. Palpations: Abdomen is soft. There is no mass. Tenderness: There is no abdominal tenderness. There is no guarding or rebound. Musculoskeletal: Normal range of motion. General: No tenderness. Lymphadenopathy:      Cervical: No cervical adenopathy.    Skin:     General: Skin is warm and dry. Findings: No erythema or rash. Neurological:      Mental Status: She is alert. Cranial Nerves: No cranial nerve deficit. Coordination: Coordination abnormal.      Deep Tendon Reflexes: Reflexes are normal and symmetric. Comments: There is some ataxia noted in the right upper extremity. There is also a question of some visual neglect on the left side. No other acute focal findings are noted. Psychiatric:      Comments: Unable to determine. MDM  Number of Diagnoses or Management Options  Cerebrovascular accident (CVA), unspecified mechanism ([facility] [address]): new and requires workup     Amount and/or Complexity of Data Reviewed  Clinical lab tests: ordered and reviewed  Tests in the radiology section of CPT®: ordered and reviewed  Decide to obtain previous medical records or to obtain history from someone other than the patient: yes  Review and summarize past medical records: yes  Discuss the patient with other providers: yes  Independent visualization of images, tracings, or specimens: yes    Risk of Complications, Morbidity, and/or Mortality  Presenting problems: high  Diagnostic procedures: high  Management options: high    Patient Progress  Patient progress: stable         Procedures    ED MD EKG interpretation: Normal sinus rhythm with a rate at 72 beats a minute. Axis is normal.  Intervals are normal.  Nonspecific ST and T wave changes noted. No ectopy noted. Marv Waldron MD    Patient is cooperative to some degree but is noted to have some ataxia of the right arm and visual neglect to some degree on the left. Pain CT  does not show any acute process. CTA and perfusion studies were ordered. Labs are pending and consult will be placed with the hospitalist for admission and further evaluation for strokelike syndrome assuming the CTA is negative for LVO. The patient was discussed and the disposition was established with telemetry neurology.     Patient will require admission by the hospitalist for further evaluation of stroke. She is not a candidate for TPA. Hospitalist Segundo Text for Admission  2:45 PM    ED Room Number: OL78/06  Patient Name and age:  Michelle Arrington 76 y.o.  female  Working Diagnosis:   1.  Cerebrovascular accident (CV[facility]), unspecified [facility]      Readmission: no  Isolation Requirements:  no  Recommended Level of Care:  telemetry  Code Status:  full  Other:    Department:[facility] - [phone]

## 2020-02-27 NOTE — H&P
[address] [facility]  History and Physical    Primary Care Provider: Other, MD Lucas  Date of Service:  2/27/2020    Subjective:     Betzy Brown is a 76 y.o. female with PMH of hypertension and dementia. Patient lives at home with good support from the neighboring apartments. Patient apparently fell around 8 AM and noted to be more confused so EMS was called and patient was brought to the ED for further evaluation. When I evaluated patient, she is pleasantly confused. Patient denied any chest pain, shortness of breath, palpitation, pain anywhere else. Patient did not make sense on more open questions. No dizziness or lightheadedness. No other concerns. In the ED patient was evaluated for acute CVA. Per my last note in December 201912/6/2019, Ms. Emma Colón, who lives in the same apartment complex is her POA. I tried to call her from the number I noted in the chart without any response. Review of Systems:    Review of systems not obtained due to patient factors. Very limited review of system due to confusion. No past medical history on file. No past surgical history on file. Prior to Admission medications    Medication Sig Start Date End Date Taking? Authorizing Provider   hydroCHLOROthiazide (HYDRODIURIL) 12.5 mg tablet Take 12.5 mg by mouth daily. Provider, Historical   amLODIPine (NORVASC) 5 mg tablet Take 5 mg by mouth daily. Provider, Historical     No Known Allergies   No family history on file. SOCIAL HISTORY:  Patient resides at Home. Patient ambulates independently. Smoking history: never  Alcohol history: None      Objective:       Physical Exam:   General:  Alert, cooperative, no distress, appears stated age. Baseline dementia.  Thin cachectic habitus   HEENT:  Normocephalic, atraumatic, PERRLA, EOMI, mucosa dry, oropharyngeal exam limited but unremarkable, no sinus tenderness   Neck: Supple, symmetrical, trachea midline, no adenopathy, thyroid: no enlargement/tenderness/nodules, no carotid bruit and no JVD. Back:   Symmetric, no curvature. ROM normal. No CVA tenderness. Lungs:   Clear to auscultation bilaterally. Heart:  Regular rate and rhythm, S1, S2 normal, no murmur, click, rub or gallop. Abdomen:   Soft, non-tender. Bowel sounds normal. No masses,  No organomegaly. Extremities: Extremities normal, atraumatic, no cyanosis or edema. Pulses: 2+ and symmetric all extremities. Skin: Skin color, texture, turgor normal. No rashes or lesions. Lymph nodes: Cervical, supraclavicular, and axillary nodes normal.   Neurologic: CNII-XII intact. Normal strength, sensation and reflexes throughout. Pt is alert, awake, confused completely. Oriented to self but not to time, place, persons. No focal neurological deficits on my limited exam given limited cooperation from this pleasant patient. Cap refill: Brisk, less than 3 seconds  Pulses: 2+, symmetric in all extremities    Data Review: All diagnostic labs and studies have been reviewed. Radiology  Cta Code Neuro Head And Neck W Cont    Result Date: 2/27/2020  IMPRESSION: No acute abnormality. Negative CTA head and neck    Ct Code Neuro Head Wo Contrast    Result Date: 2/27/2020  IMPRESSION: 1. No acute intracranial abnormality. 2. Stable microvascular ischemic and age-related change. Ct Code Neuro Perf W Cbf    Result Date: 2/27/2020  IMPRESSION: No acute abnormality.  Negative CTA head and neck    Recent Results (from the past 24 hour(s))   EKG, 12 LEAD, INITIAL    Collection Time: 02/27/20  1:18 PM   Result Value Ref Range    Ventricular Rate 72 BPM    Atrial Rate 72 BPM    P-R Interval 140 ms    QRS Duration 76 ms    Q-T Interval 394 ms    QTC Calculation (Bezet) 431 ms    Calculated P Axis 65 degrees    Calculated R Axis 57 degrees    Calculated T Axis -52 degrees    Diagnosis       Normal sinus rhythm  ST & T wave abnormality, consider inferior ischemia  ST & T wave abnormality, consider anterior ischemia  When compared with ECG of 06-DEC-2019 12:39,  ST less elevated in Inferior leads  ST now depressed in Anterior leads  T wave inversion now evident in Inferior leads  T wave inversion now evident in Anterior leads     CBC WITH AUTOMATED DIFF    Collection Time: 02/27/20  1:23 PM   Result Value Ref Range    WBC 5.4 3.6 - 11.0 K/uL    RBC 4.22 3.80 - 5.20 M/uL    HGB 13.0 11.5 - 16.0 g/dL    HCT 38.4 35.0 - 47.0 %    MCV 91.0 80.0 - 99.0 FL    MCH 30.8 26.0 - 34.0 PG    MCHC 33.9 30.0 - 36.5 g/dL    RDW 11.9 11.5 - 14.5 %    PLATELET 422 817 - 715 K/uL    MPV 10.2 8.9 - 12.9 FL    NRBC 0.0 0  WBC    ABSOLUTE NRBC 0.00 0.00 - 0.01 K/uL    NEUTROPHILS 76 (H) 32 - 75 %    LYMPHOCYTES 15 12 - 49 %    MONOCYTES 9 5 - 13 %    EOSINOPHILS 0 0 - 7 %    BASOPHILS 0 0 - 1 %    IMMATURE GRANULOCYTES 0 0.0 - 0.5 %    ABS. NEUTROPHILS 4.1 1.8 - 8.0 K/UL    ABS. LYMPHOCYTES 0.8 0.8 - 3.5 K/UL    ABS. MONOCYTES 0.5 0.0 - 1.0 K/UL    ABS. EOSINOPHILS 0.0 0.0 - 0.4 K/UL    ABS. BASOPHILS 0.0 0.0 - 0.1 K/UL    ABS. IMM. GRANS. 0.0 0.00 - 0.04 K/UL    DF SMEAR SCANNED      RBC COMMENTS NORMOCYTIC, NORMOCHROMIC     METABOLIC PANEL, COMPREHENSIVE    Collection Time: 02/27/20  1:23 PM   Result Value Ref Range    Sodium 129 (L) 136 - 145 mmol/L    Potassium 3.3 (L) 3.5 - 5.1 mmol/L    Chloride 96 (L) 97 - 108 mmol/L    CO2 26 21 - 32 mmol/L    Anion gap 7 5 - 15 mmol/L    Glucose 102 (H) 65 - 100 mg/dL    BUN 14 6 - 20 MG/DL    Creatinine 0.63 0.55 - 1.02 MG/DL    BUN/Creatinine ratio 22 (H) 12 - 20      GFR est AA >60 >60 ml/min/1.73m2    GFR est non-AA >60 >60 ml/min/1.73m2    Calcium 8.1 (L) 8.5 - 10.1 MG/DL    Bilirubin, total 0.9 0.2 - 1.0 MG/DL    ALT (SGPT) 21 12 - 78 U/L    AST (SGOT) 23 15 - 37 U/L    Alk.  phosphatase 79 45 - 117 U/L    Protein, total 6.7 6.4 - 8.2 g/dL    Albumin 3.4 (L) 3.5 - 5.0 g/dL    Globulin 3.3 2.0 - 4.0 g/dL    A-G Ratio 1.0 (L) 1.1 - 2.2     PROTHROMBIN TIME + INR    Collection Time: 02/27/20  1:23 PM   Result Value Ref Range    INR 1.1 0.9 - 1.1      Prothrombin time 11.4 (H) 9.0 - 11.1 sec   PTT    Collection Time: 02/27/20  1:23 PM   Result Value Ref Range    aPTT 25.0 22.1 - 32.0 sec    aPTT, therapeutic range     58.0 - 77.0 SECS   TROPONIN I    Collection Time: 02/27/20  1:23 PM   Result Value Ref Range    Troponin-I, Qt. <0.05 <0.05 ng/mL   SAMPLES BEING HELD    Collection Time: 02/27/20  1:23 PM   Result Value Ref Range    SAMPLES BEING HELD 1red     COMMENT        Add-on orders for these samples will be processed based on acceptable specimen integrity and analyte stability, which may vary by analyte.    GLUCOSE, POC    Collection Time: 02/27/20  1:30 PM   Result Value Ref Range    Glucose (POC) 94 65 - 100 mg/dL    Performed by Hernan ALFORD/ [facility]CROSCOP[employer]    Collection Time: 02/27/20  1:35 PM   Result Value Ref Range    Color YELLOW/STRAW      Appearance CLEAR CLEAR      Specific gravity 1.021 1.003 - 1.030      pH (UA) 6.5 5.0 - 8.0      Protein NEGATIVE  NEG mg/dL    Glucose NEGATIVE  NEG mg/dL    Ketone 15 (A) NEG mg/dL    Bilirubin NEGATIVE  NEG      Blood TRACE (A) NEG      Urobilinogen 0.2 0.2 - 1.0 EU/dL    Nitrites NEGATIVE  NEG      Leukocyte Esterase NEGATIVE  NEG      WBC 0-4 0 - 4 /hpf    RBC 10-20 0 - 5 /hpf    Epithelial cells FEW FEW /lpf    Bacteria NEGATIVE  NEG /hpf    Hyaline cast 0-2 0 - 5 /lpf   ECHO ADULT COMPLETE    Collection Time: 02/27/20  4:17 PM   Result Value Ref Range    LV E' Septal Velocity 10.93 cm/s         Assessment:     Active Problems:    Acute CVA (cerebrovascular accident) ([facility] [address] (2/27/2020)    #Altered mental status, concern for acute CVA  #Hypokalemia  #Hyponatremia with hypochloremia, likely related to dehydration  #Dementia at baseline  #Goals of care clarification    Plan:     -We will admit to inpatient, ALOS more than 2 MN, telemetry floor  -MRI brain without contrast  -Echocardiogram  -Neurology consult  -Neurochecks  -PT/OT/ST.  -N.p.o. until clear from bedside swallow eval.  -Hold home antihypertensive medications for permissive hypertension.   -On amlodipine and HydroDIURIL/HCTZ. -Given recurrent electrolyte abnormalities, will DC HCTZ on discharge  -Continue amlodipine when clinically stable  -Hold any neuro depressant medications  -IV fluid: NS at 75 mL/h  -Serial BMP check along with magnesium  -KCl 40 MEQ IV  -Add on mag  -Palliative care consult for further goals of care clarification  -Patient would be appropriate DNR, however not able to contact POA at present and patient is not able to participate in decision-making. FUNCTIONAL STATUS PRIOR TO HOSPITALIZATION (including history of recent falls): Independent per previous records    CODE STATUS: Full code  DVT prophylaxis: Heparin SQ  Emergency contact: Need to verify about Ms. Evie Lagos    Time spent for the patient care is more than 55 minutes.     Signed By: Eulalio Hinojosa MD     February 27, 2020

## 2020-02-28 ENCOUNTER — APPOINTMENT (OUTPATIENT)
Dept: GENERAL RADIOLOGY | Age: 75
DRG: 689 | End: 2020-02-28
Attending: NURSE PRACTITIONER
Payer: MEDICARE

## 2020-02-28 LAB
ALBUMIN SERPL-MCNC: 3.3 G/DL (ref 3.5–5)
ALBUMIN/GLOB SERPL: 1 {RATIO} (ref 1.1–2.2)
ALP SERPL-CCNC: 67 U/L (ref 45–117)
ALT SERPL-CCNC: 23 U/L (ref 12–78)
ANION GAP SERPL CALC-SCNC: 6 MMOL/L (ref 5–15)
AST SERPL-CCNC: 27 U/L (ref 15–37)
ATRIAL RATE: 72 BPM
ATRIAL RATE: 80 BPM
AV VELOCITY RATIO: 0.98
BILIRUB SERPL-MCNC: 0.8 MG/DL (ref 0.2–1)
BUN SERPL-MCNC: 16 MG/DL (ref 6–20)
BUN/CREAT SERPL: 23 (ref 12–20)
CALCIUM SERPL-MCNC: 8.3 MG/DL (ref 8.5–10.1)
CALCULATED P AXIS, ECG09: 65 DEGREES
CALCULATED P AXIS, ECG09: 76 DEGREES
CALCULATED R AXIS, ECG10: 57 DEGREES
CALCULATED R AXIS, ECG10: 60 DEGREES
CALCULATED T AXIS, ECG11: -52 DEGREES
CALCULATED T AXIS, ECG11: 26 DEGREES
CHLORIDE SERPL-SCNC: 97 MMOL/L (ref 97–108)
CHOLEST SERPL-MCNC: 218 MG/DL
CO2 SERPL-SCNC: 26 MMOL/L (ref 21–32)
CREAT SERPL-MCNC: 0.69 MG/DL (ref 0.55–1.02)
DIAGNOSIS, 93000: NORMAL
DIAGNOSIS, 93000: NORMAL
ECHO AV PEAK GRADIENT: 3.8 MMHG
ECHO AV PEAK VELOCITY: 97.91 CM/S
ECHO LV E' SEPTAL VELOCITY: 10.93 CM/S
ECHO LVOT PEAK GRADIENT: 3.7 MMHG
ECHO LVOT PEAK VELOCITY: 96.4 CM/S
ECHO MV A VELOCITY: 91.71 CM/S
ECHO MV E VELOCITY: 56.78 CM/S
ECHO MV E/A RATIO: 0.62
ECHO MV E/E' SEPTAL: 5.19
ERYTHROCYTE [DISTWIDTH] IN BLOOD BY AUTOMATED COUNT: 11.7 % (ref 11.5–14.5)
GLOBULIN SER CALC-MCNC: 3.3 G/DL (ref 2–4)
GLUCOSE SERPL-MCNC: 104 MG/DL (ref 65–100)
HCT VFR BLD AUTO: 36.8 % (ref 35–47)
HDLC SERPL-MCNC: 66 MG/DL
HDLC SERPL: 3.3 {RATIO} (ref 0–5)
HGB BLD-MCNC: 12.6 G/DL (ref 11.5–16)
INR PPP: 1.2 (ref 0.9–1.1)
LDLC SERPL CALC-MCNC: 137.6 MG/DL (ref 0–100)
LIPID PROFILE,FLP: ABNORMAL
MAGNESIUM SERPL-MCNC: 2 MG/DL (ref 1.6–2.4)
MCH RBC QN AUTO: 31.1 PG (ref 26–34)
MCHC RBC AUTO-ENTMCNC: 34.2 G/DL (ref 30–36.5)
MCV RBC AUTO: 90.9 FL (ref 80–99)
NRBC # BLD: 0 K/UL (ref 0–0.01)
NRBC BLD-RTO: 0 PER 100 WBC
P-R INTERVAL, ECG05: 134 MS
P-R INTERVAL, ECG05: 140 MS
PLATELET # BLD AUTO: 160 K/UL (ref 150–400)
PMV BLD AUTO: 10.7 FL (ref 8.9–12.9)
POTASSIUM SERPL-SCNC: 3.2 MMOL/L (ref 3.5–5.1)
PROT SERPL-MCNC: 6.6 G/DL (ref 6.4–8.2)
PROTHROMBIN TIME: 11.7 SEC (ref 9–11.1)
Q-T INTERVAL, ECG07: 374 MS
Q-T INTERVAL, ECG07: 394 MS
QRS DURATION, ECG06: 76 MS
QRS DURATION, ECG06: 92 MS
QTC CALCULATION (BEZET), ECG08: 431 MS
QTC CALCULATION (BEZET), ECG08: 431 MS
RBC # BLD AUTO: 4.05 M/UL (ref 3.8–5.2)
SODIUM SERPL-SCNC: 129 MMOL/L (ref 136–145)
TRIGL SERPL-MCNC: 72 MG/DL (ref ?–150)
VENTRICULAR RATE, ECG03: 72 BPM
VENTRICULAR RATE, ECG03: 80 BPM
VLDLC SERPL CALC-MCNC: 14.4 MG/DL
WBC # BLD AUTO: 4.3 K/UL (ref 3.6–11)

## 2020-02-28 PROCEDURE — 36415 COLL VENOUS BLD VENIPUNCTURE: CPT

## 2020-02-28 PROCEDURE — 87186 SC STD MICRODIL/AGAR DIL: CPT

## 2020-02-28 PROCEDURE — 80061 LIPID PANEL: CPT

## 2020-02-28 PROCEDURE — 77030019905 HC CATH URETH INTMIT MDII -A

## 2020-02-28 PROCEDURE — 77030038269 HC DRN EXT URIN PURWCK BARD -A

## 2020-02-28 PROCEDURE — 80053 COMPREHEN METABOLIC PANEL: CPT

## 2020-02-28 PROCEDURE — 74011250636 HC RX REV CODE- 250/636: Performed by: INTERNAL MEDICINE

## 2020-02-28 PROCEDURE — 94760 N-INVAS EAR/PLS OXIMETRY 1: CPT

## 2020-02-28 PROCEDURE — 87077 CULTURE AEROBIC IDENTIFY: CPT

## 2020-02-28 PROCEDURE — 85027 COMPLETE CBC AUTOMATED: CPT

## 2020-02-28 PROCEDURE — 97167 OT EVAL HIGH COMPLEX 60 MIN: CPT

## 2020-02-28 PROCEDURE — 97535 SELF CARE MNGMENT TRAINING: CPT

## 2020-02-28 PROCEDURE — 74011250637 HC RX REV CODE- 250/637: Performed by: INTERNAL MEDICINE

## 2020-02-28 PROCEDURE — 87086 URINE CULTURE/COLONY COUNT: CPT

## 2020-02-28 PROCEDURE — 83735 ASSAY OF MAGNESIUM: CPT

## 2020-02-28 PROCEDURE — 81001 URINALYSIS AUTO W/SCOPE: CPT

## 2020-02-28 PROCEDURE — 97116 GAIT TRAINING THERAPY: CPT | Performed by: PHYSICAL THERAPIST

## 2020-02-28 PROCEDURE — 97161 PT EVAL LOW COMPLEX 20 MIN: CPT | Performed by: PHYSICAL THERAPIST

## 2020-02-28 PROCEDURE — 85610 PROTHROMBIN TIME: CPT

## 2020-02-28 PROCEDURE — 71045 X-RAY EXAM CHEST 1 VIEW: CPT

## 2020-02-28 PROCEDURE — 65660000000 HC RM CCU STEPDOWN

## 2020-02-28 RX ORDER — GUAIFENESIN 100 MG/5ML
81 LIQUID (ML) ORAL DAILY
Status: DISCONTINUED | OUTPATIENT
Start: 2020-02-29 | End: 2020-03-02 | Stop reason: HOSPADM

## 2020-02-28 RX ORDER — SODIUM CHLORIDE 9 MG/ML
75 INJECTION, SOLUTION INTRAVENOUS CONTINUOUS
Status: DISCONTINUED | OUTPATIENT
Start: 2020-02-28 | End: 2020-02-29

## 2020-02-28 RX ADMIN — DOCUSATE SODIUM 100 MG: 100 CAPSULE, LIQUID FILLED ORAL at 18:51

## 2020-02-28 RX ADMIN — ACETAMINOPHEN 650 MG: 325 TABLET ORAL at 22:16

## 2020-02-28 RX ADMIN — HEPARIN SODIUM 5000 UNITS: 5000 INJECTION INTRAVENOUS; SUBCUTANEOUS at 08:55

## 2020-02-28 RX ADMIN — SODIUM CHLORIDE 75 ML/HR: 900 INJECTION, SOLUTION INTRAVENOUS at 09:41

## 2020-02-28 RX ADMIN — SODIUM CHLORIDE 75 ML/HR: 900 INJECTION, SOLUTION INTRAVENOUS at 22:12

## 2020-02-28 RX ADMIN — DOCUSATE SODIUM 100 MG: 100 CAPSULE, LIQUID FILLED ORAL at 09:55

## 2020-02-28 RX ADMIN — Medication 10 ML: at 22:11

## 2020-02-28 RX ADMIN — HEPARIN SODIUM 5000 UNITS: 5000 INJECTION INTRAVENOUS; SUBCUTANEOUS at 00:17

## 2020-02-28 RX ADMIN — Medication 10 ML: at 06:00

## 2020-02-28 RX ADMIN — HEPARIN SODIUM 5000 UNITS: 5000 INJECTION INTRAVENOUS; SUBCUTANEOUS at 18:50

## 2020-02-28 NOTE — PROGRESS NOTES
Bedside and Verbal shift change report given to Kaylin Morton RN (oncoming nurse) by ANGELITO Sidhu (offgoing nurse). Report included the following information SBAR, Kardex, Intake/Output, Accordion, Recent Results, Cardiac Rhythm NSR and Dual Neuro Assessment.

## 2020-02-28 NOTE — PHYSICIAN ADVISORY
Letter of Status Determination:  
Recommend hospitalization status upgraded from INPATIENT  to INPATIENT  Status Pt Name:  Jeffrey Isbell MR#  
TOMMIE # [id number] / 
[id number] Payor: Santos Rosen / Plan: [employer] / Product Type: Managed Care Medicare /   
[employer]#  [id number] Room [facility] Hospital  664/01  @ . [place] [address] hospital  
Hospitalization date  2/27/2020 12:58 PM  
Current Attending Physician  Teresa Novoa,   
Principal diagnosis  Acute CVA (cerebrovascular accident) [facility]) [I63.9] Clinicals  76 y.o. y.o  female hospitalized with above diagnosis Active Problems: 
  Acute CVA (cerebrovascular accident) ([facility] [address] (2/27/2020) 
  
#Altered mental status, concern for acute CVA #Hypokalemia #Hyponatremia with hypochloremia, likely related to dehydration N.p.o. until clear from bedside swallow eval. 
-Hold home antihypertensive medications for permissive hypertension.  
-On amlodipine and HydroDIURIL/HCTZ. -Given recurrent electrolyte abnormalities, will DC HCTZ on discharge 
-Continue amlodipine when clinically stable 
-Hold any neuro depressant medications -IV fluid: NS at 75 mL/h 
-Serial BMP check along with magnesium -KCl 40 MEQ IV 
-Add on mag [place] (MCG) criteria Does  NOT apply STATUS DETERMINATION  This patient is at above high risk of deterioration based on documented presenting clinical data, comorbid conditions, high risk of adverse events and current acute care course. Ms. Jeffrey Isbell now meets Inpatient Admission status criteria in accordance with CMS regulation Section 43 .3. Specifically, due to medical necessity the patient's stay now exceeds Two Midnights. It is our recommendation that this patient's hospitalization status should be upgraded from  INPATIENT to INPATIENT status. The final decision of the patient's hospitalization status depends on the attending physician's judgment Additional comments Payor: Trenda Gosselin / Plan: [address] / Product Type: Managed Care Medicare /   
  
 
 
Maida Severe Dr. Waynette [address] T: [phone]    [email or web address]

## 2020-02-28 NOTE — CONSULTS
INPATIENT NEUROLOGY CONSULTATION  2/28/2020     Consulted by: Teresa Novoa DO        Patient ID:  [facility]  [id number]  76 y.o.  1945    CC: Fall and confusion    HPI    Maude Lowry is a 59-year-old woman with a reported history of hypertension and dementia who was brought here from her home for being confused after a fall. She is an extremely poor historian and uncooperative. Work-up thus far showing a negative CTA. Elevated LDL. I do not see any aspirin prior to admission. Review of Systems   Unable to perform ROS: Dementia       No past medical history on file. No family history on file.   Social History     Socioeconomic History    Marital status:      Spouse name: Not on file    Number of children: Not on file    Years of education: Not on file    Highest education level: Not on file   Occupational History    Not on file   Social Needs    Financial resource strain: Not on file    Food insecurity:     Worry: Not on file     Inability: Not on file    Transportation needs:     Medical: Not on file     Non-medical: Not on file   Tobacco Use    Smoking status: Not on file   Substance and Sexual Activity    Alcohol use: Not on file    Drug use: Not on file    Sexual activity: Not on file   Lifestyle    Physical activity:     Days per week: Not on file     Minutes per session: Not on file    Stress: Not on file   Relationships    Social connections:     Talks on phone: Not on file     Gets together: Not on file     Attends Jew service: Not on file     Active member of club or organization: Not on file     Attends meetings of clubs or organizations: Not on file     Relationship status: Not on file    Intimate partner violence:     Fear of current or ex partner: Not on file     Emotionally abused: Not on file     Physically abused: Not on file     Forced sexual activity: Not on file   Other Topics Concern    Not on file   Social History Narrative    Not on file     Current Facility-Administered Medications   Medication Dose Route Frequency    sodium chloride (NS) flush 5-40 mL  5-40 mL IntraVENous Q8H    sodium chloride (NS) flush 5-40 mL  5-40 mL IntraVENous PRN    acetaminophen (TYLENOL) tablet 650 mg  650 mg Oral Q6H PRN    ondansetron (ZOFRAN) injection 4 mg  4 mg IntraVENous Q4H PRN    docusate sodium (COLACE) capsule 100 mg  100 mg Oral BID    heparin (porcine) injection 5,000 Units  5,000 Units SubCUTAneous Q8H    0.9% sodium chloride infusion  75 mL/hr IntraVENous CONTINUOUS     No Known Allergies    Visit Vitals  /70 (BP 1 Location: Right arm, BP Patient Position: At rest)   Pulse 80   Temp 98.3 °F (36.8 °C)   Resp 18   Wt 47.9 kg (105 lb 9.6 oz)   SpO2 98%     Physical Exam   Constitutional: She appears well-developed and well-nourished. Cardiovascular: Normal rate. Pulmonary/Chest: Effort normal.   Skin: Skin is warm and dry. Psychiatric:   Trying to get out of bed very irritable does not want to answer my questions   Vitals reviewed. Neurologic Exam     Mental Status   Elderly woman in bed very agitated. She is trying to get out of bed. She agrees with me that she is in the hospital but cannot tell me why nor which hospital.  She keeps looking to the information on the walls to provide the answers. She does not know the month or year or recent holiday. Her attention is poor making the exam limited. Pupils appear equal with a grossly symmetric face  Speech sounds clear but I cannot assess language properly, she is speaking in fragments with some logic but then needs redirection  She is moving all extremities spontaneously symmetrically trying to get out of bed. No abnormal movements I could appreciate.   Gait deferred as she is a falls risk with poor comprehension            Lab Results   Component Value Date/Time    WBC 4.3 02/28/2020 04:08 AM    HGB 12.6 02/28/2020 04:08 AM    HCT 36.8 02/28/2020 04:08 AM    PLATELET 197 36/80/5906 04:08 AM MCV 90.9 02/28/2020 04:08 AM     Lab Results   Component Value Date/Time    Glucose 104 (H) 02/28/2020 04:08 AM    Glucose (POC) 94 02/27/2020 01:30 PM    LDL, calculated 137.6 (H) 02/28/2020 04:08 AM    Creatinine 0.69 02/28/2020 04:08 AM      Lab Results   Component Value Date/Time    Cholesterol, total 218 (H) 02/28/2020 04:08 AM    HDL Cholesterol 66 02/28/2020 04:08 AM    LDL, calculated 137.6 (H) 02/28/2020 04:08 AM    Triglyceride 72 02/28/2020 04:08 AM    CHOL/HDL Ratio 3.3 02/28/2020 04:08 AM     Lab Results   Component Value Date/Time    ALT (SGPT) 23 02/28/2020 04:08 AM    AST (SGOT) 27 02/28/2020 04:08 AM    Alk. phosphatase 67 02/28/2020 04:08 AM    Bilirubin, total 0.8 02/28/2020 04:08 AM    Albumin 3.3 (L) 02/28/2020 04:08 AM    Protein, total 6.6 02/28/2020 04:08 AM    INR 1.2 (H) 02/28/2020 04:08 AM    Prothrombin time 11.7 (H) 02/28/2020 04:08 AM    PLATELET 525 38/76/1879 04:08 AM        CT Results (maximum last 3): Results from Hospital Encounter encounter on 02/27/20   CT CODE NEURO PERF W CBF    Narrative INDICATION: Blurred vision. Contrast-enhanced CT angiogram head and neck performed using 100 cc Isovue-370. Three-dimensional postprocessing was performed. CT perfusion analysis using computed tomography with contrast administration  including postprocessing of parametric maps with determination of cerebral blood  flow, cerebral blood volume, and mean transit time was also performed. CT dose reduction was achieved through use of a standardized protocol tailored  for this examination and are automatic exposure control for dose modulation dose  reduction    NECK:    The origins of the great vessels are patent. Both vertebral arteries are patent. Both carotid arteries are patent. There is no significant stenosis using NASCET  criteria. Head:    Major intracranial vessels are patent. No large vessel occlusion or intracranial  aneurysm. Left vertebral artery terminates in PICA.  There is fetal origin of  left posterior cerebral artery. No evidence for intracranial hemorrhage or acute  stroke. The underlying brain is unremarkable. Perfusion imaging demonstrates symmetric intracranial perfusion. .      Impression IMPRESSION: No acute abnormality. Negative CTA head and neck   CTA CODE NEURO HEAD AND NECK W CONT    Narrative INDICATION: Blurred vision. Contrast-enhanced CT angiogram head and neck performed using 100 cc Isovue-370. Three-dimensional postprocessing was performed. CT perfusion analysis using computed tomography with contrast administration  including postprocessing of parametric maps with determination of cerebral blood  flow, cerebral blood volume, and mean transit time was also performed. CT dose reduction was achieved through use of a standardized protocol tailored  for this examination and are automatic exposure control for dose modulation dose  reduction    NECK:    The origins of the great vessels are patent. Both vertebral arteries are patent. Both carotid arteries are patent. There is no significant stenosis using NASCET  criteria. Head:    Major intracranial vessels are patent. No large vessel occlusion or intracranial  aneurysm. Left vertebral artery terminates in PICA. There is fetal origin of  left posterior cerebral artery. No evidence for intracranial hemorrhage or acute  stroke. The underlying brain is unremarkable. Perfusion imaging demonstrates symmetric intracranial perfusion. .      Impression IMPRESSION: No acute abnormality. Negative CTA head and neck             Assessment and Plan        80-year-old woman who was found confused after a fall. I do not see any soft tissue injury to her on gross examination. She does have dementia. She does not know where she is and she rambles. I would recommend obtaining that MRI if possible but if she is extremely uncooperative we certainly can hold off given the benign CTA.   I could not appreciate any focal asymmetric findings on the limited exam given her poor cooperation. We will follow-up if the MRI is completed. Please call if needed. This clinical note was dictated with an electronic dictation software that can make unintentional errors. If there are any questions, please contact me directly for clarification.       [address] [facility],   NEUROLOGIST  Diplomate MALIKA  2/28/2020

## 2020-02-28 NOTE — PROGRESS NOTES
Spiritual Care Assessment/Progress Note  [address]      NAME: Janis Bowman      MRN: [id number]  AGE: 76 y.o. SEX: female  Spiritism Affiliation: Bernadette Watson   Language: English     2/28/2020     Total Time (in minutes): 5     Spiritual Assessment begun in [address] [facility] G[facility] through conversation with:         []Patient        [] Family    [] Friend(s)              Spiritual beliefs: (Please include comment if needed)     [] Identifies with a james tradition:         [] Supported by a james community:            [] Claims no spiritual orientation:           [] Seeking spiritual identity:                [] Adheres to an individual form of spirituality:           [x] Not able to assess:                           Identified resources for coping:      [] Prayer                               [] Music                  [] Guided Imagery     [] Family/friends                 [] Pet visits     [] Devotional reading                         [x] Unknown     [] Other:                                               Interventions offered during this visit: (See comments for more details)                Plan of Care:     [] Support spiritual and/or cultural needs    [] Support AMD and/or advance care planning process      [] Support grieving process   [] Coordinate Rites and/or Rituals    [] Coordination with community clergy   [] No spiritual needs identified at this time   [] Detailed Plan of Care below (See Comments)  [] Make referral to Music Therapy  [] Make referral to Pet Therapy     [] Make referral to Addiction services  [] Make referral to [facility]  [] Make referral to Spiritual Care Partner  [] No future visits requested        [x] Follow up visits as needed     Comments:  visit for initial spiritual assessment, palliative care consult. Patient sleeping, appears comfortable. Did not awaken to knock at door, presence in room, or verbal greeting x 2.   Will continue to follow up as needed and upon request as able. Visited by .  Shabnam Dick MDiv, [facility], [facility] paging service: [phone])

## 2020-02-28 NOTE — ROUTINE PROCESS
TRANSFER - OUT REPORT: 
 
Verbal report given to Hafsa Guajardo (name) on Abbey Osman  being transferred to NSTU (unit) for routine progression of care Report consisted of patients Situation, Background, Assessment and  
Recommendations(SBAR). Information from the following report(s) SBAR, ED Summary, [facility] and Recent Results was reviewed with the receiving nurse. Lines:  
Peripheral IV 02/27/20 Left Antecubital (Active) Opportunity for questions and clarification was provided.

## 2020-02-28 NOTE — PROGRESS NOTES
Bedside and verbal shift change report given to Aliya Damon RN (oncoming nurse) and Igor Renteria (student nurse) by Dean Ford RN (offgoing nurse). Report included the following information: SBAR, Kardex, Intake/Output, Recent Laps, and Neuro Assessment. 4857: Assessment completed, A&OX1 to self,  unequal L>R, expressive aphasia noted - patient responds with incoherent, disoriented sentences. 0945:   Visit Vitals  /70 (BP 1 Location: Right arm, BP Patient Position: At rest)   Pulse 80   Temp 98.3 °F (36.8 °C)   Resp 18   Wt 47.9 kg (105 lb 9.6 oz)   SpO2 98%     Pt denies any pain    0954: dysphagia swallow screening completed with Riccardo Moritz, RN (nurse educator). Patient had no issues with applesauce, water by cup, or water by straw swallowing. 1142: Checked on pt to find serosanguineous drainage from IV site, Assessed IV location and turned off IV pump. Will reassess after lunch - may need removal and replacement. Pt denies any pain at IV site. 1300: Returned from lunch, pt sleeping. Removed breakfast tray from room, patient ate 50%. Lunch tray in room, pt has not touched. Pt is lethargic and disinterested in eating. Reassessed pt. A&OX0, was unable to state name or recall birthday. Does not follow directions appropriately. 1342: 20G PIV catheter discontinued intact from Left AC. Site without signs and symptoms of complications. Dressing and pressure applied. 22G PIV started in Left forearm. 1 attempt, tegaderm applied, patent and IV normal saline resumed at 75mL/hr. Linens changed due to bleeding and drainage from old PIV.    1346:   Visit Vitals  /65 (BP 1 Location: Right arm, BP Patient Position: At rest)   Pulse 94   Temp 98.9 °F (37.2 °C)   Resp 18   Wt 47.9 kg (105 lb 9.6 oz)   SpO2 97%     Pt denies any pain.

## 2020-02-28 NOTE — PROGRESS NOTES
Problem: Pressure Injury - Risk of  Goal: *Prevention of pressure injury  Description  Document Delbert Scale and appropriate interventions in the flowsheet. Outcome: Progressing Towards Goal  Note: Pressure Injury Interventions:  Sensory Interventions: Assess changes in LOC, Avoid rigorous massage over bony prominences, Check visual cues for pain, Float heels, Keep linens dry and wrinkle-free, Minimize linen layers, Monitor skin under medical devices, Pressure redistribution bed/mattress (bed type)    Moisture Interventions: Absorbent underpads, Apply protective barrier, creams and emollients, Check for incontinence Q2 hours and as needed, Internal/External urinary devices, Limit adult briefs, Minimize layers    Activity Interventions: Increase time out of bed, Pressure redistribution bed/mattress(bed type), PT/OT evaluation    Mobility Interventions: HOB 30 degrees or less, Pressure redistribution bed/mattress (bed type), PT/OT evaluation    Nutrition Interventions: Document food/fluid/supplement intake    Friction and Shear Interventions: HOB 30 degrees or less, Lift sheet, Minimize layers, Apply protective barrier, creams and emollients                Problem: TIA/CVA Stroke: 0-24 hours  Goal: Activity/Safety  Outcome: Progressing Towards Goal  Goal: Consults, if ordered  Outcome: Progressing Towards Goal  Goal: Diagnostic Test/Procedures  Outcome: Progressing Towards Goal  Goal: Discharge Planning  Outcome: Progressing Towards Goal  Goal: Medications  Outcome: Progressing Towards Goal  Goal: Respiratory  Outcome: Progressing Towards Goal  Goal: Treatments/Interventions/Procedures  Outcome: Progressing Towards Goal  Goal: *Stroke education started(Stroke Metric)  Outcome: Progressing Towards Goal     Problem: Falls - Risk of  Goal: *Absence of Falls  Description  Document Soledad Fall Risk and appropriate interventions in the flowsheet.   Outcome: Progressing Towards Goal  Note: Fall Risk Interventions: Mentation Interventions: Bed/chair exit alarm, Adequate sleep, hydration, pain control, More frequent rounding, Update white board, Toileting rounds, Reorient patient    Medication Interventions: Bed/chair exit alarm, Evaluate medications/consider consulting pharmacy, Patient to call before getting OOB, Teach patient to arise slowly    Elimination Interventions: Call light in reach, Patient to call for help with toileting needs, Toileting schedule/hourly rounds, Bed/chair exit alarm    History of Falls Interventions: Bed/chair exit alarm, Door open when patient unattended, Room close to nurse's station, Vital signs minimum Q4HRs X 24 hrs (comment for end date), Evaluate medications/consider consulting pharmacy, Investigate reason for fall, Consult care management for discharge planning

## 2020-02-28 NOTE — PROGRESS NOTES
Brief note-    Consult received on admission from Dr. Avery Kahn for care decisions. Pt has dementia and lives in a memory care unit. He has an AMD on file which names a friend/neighbor as mPOA. CM reached mPOA at [phone] . I updated contacts to reflect this person as decision maker. His AMD certainly clearly supports a DNR as dementia is a progressive terminal illness and I would recommend communicating this with his mPOA. Depending on his baseline and ability to return to such baseline, she may be appropriate for Hospice care. Our team is unable to see today but plan to see Monday if she remains admitted.

## 2020-02-28 NOTE — PROGRESS NOTES
Problem: Mobility Impaired (Adult and Pediatric)  Goal: *Acute Goals and Plan of Care (Insert Text)  Description  FUNCTIONAL STATUS PRIOR TO ADMISSION: Patient is a poor historian and unable to provide any information. HOME SUPPORT PRIOR TO ADMISSION: Lives at [facility] in [address] [place] [address]     Physical Therapy Goals  Initiated 2/28/2020  1. Patient will move from supine to sit and sit to supine  in bed with supervision/set-up within 7 day(s). 2.  Patient will transfer from bed to chair and chair to bed with contact guard assist using the least restrictive device within 7 day(s). 3.  Patient will perform sit to stand with contact guard assist within 7 day(s). 4.  Patient will ambulate with contact guard assist for 150 feet with the least restrictive device within 7 day(s). Outcome: Progressing Towards Goal   PHYSICAL THERAPY EVALUATION  Patient: Jeraline Halsted (52 y.o. female)  Date: 2/28/2020  Primary Diagnosis: Acute CVA (cerebrovascular accident) [facility]) [I63.9]        Precautions:   Bed Alarm, Fall      ASSESSMENT  Based on the objective data described below, the patient presents with decreased functional mobility from baseline level of function. Patient is extremely confused and has difficulty even speaking in full, coherent sentences. Able to follow commands with increased cues and needs supervision for bed mobility. Dora x 2 for ambulation with hand held A. Upright balance is poor and she is a high fall risk. Unable to sequence tasks without continuous cues (i.e. unable to sequence sitting down on the toilet). Also noted she has impaired strength to the R UE and unable to grasp objects on this side. Anticipate minimal progress with therapy overall given her dementia which will limit her ability for carryover. Recommend return to Memory Care.         Current Level of Function Impacting Discharge (mobility/balance): Dora x 2 to ambulate with HHA x 2        Other factors to consider for discharge: high fall risk, confusion limits carryover     Patient will benefit from skilled therapy intervention to address the above noted impairments. PLAN :  Recommendations and Planned Interventions: bed mobility training, transfer training, gait training, therapeutic exercises, neuromuscular re-education, patient and family training/education, and therapeutic activities      Frequency/Duration: Patient will be followed by physical therapy:  3 times a week to address goals. Recommendation for discharge: (in order for the patient to meet his/her long term goals)  Return to Memory Care       IF patient discharges home will need the following DME: to be determined (TBD)         SUBJECTIVE:   Patient stated I can't see where to sit.     OBJECTIVE DATA SUMMARY:   HISTORY:    No past medical history on file. No past surgical history on file. Personal factors and/or comorbidities impacting plan of care:     Home Situation  [address] Name: Memory Care at [facility]    EXAMINATION/PRESENTATION/DECISION MAKING:   Critical Behavior:  Neurologic State: Confused, Alert, Eyes open spontaneously  Orientation Level: Disoriented X4  Cognition: Decreased attention/concentration, Decreased command following, Impaired decision making, Impulsive, Poor safety awareness       Range Of Motion:  AROM: Generally decreased, functional                       Strength:    Strength: Generally decreased, functional       Functional Mobility:  Bed Mobility:  Rolling: Supervision  Supine to Sit: Supervision  Sit to Supine: Supervision  Scooting: Supervision  Transfers:  Sit to Stand: Minimum assistance;Assist x1  Stand to Sit: Contact guard assistance;Assist x1                       Balance:   Sitting: Intact  Standing: Impaired  Standing - Static: Constant support; Fair  Standing - Dynamic : Constant support; Fair  Ambulation/Gait Training:  Distance (ft): 15 Feet (ft)(x 2)  Assistive Device: Gait belt(hand held A x 2)  Ambulation - Level of Assistance: Minimal assistance;Assist x2     Gait Description (WDL): Exceptions to WDL  Gait Abnormalities: Decreased step clearance; Path deviations; Shuffling gait        Base of Support: Widened     Speed/Perla: Pace decreased (<100 feet/min); Slow  Step Length: Left shortened;Right shortened       Pain Rating:  No c/o pain    Activity Tolerance:   Good and requires rest breaks  Please refer to the flowsheet for vital signs taken during this treatment. After treatment patient left in no apparent distress:   Supine in bed, Call bell within reach, Bed / chair alarm activated, and Side rails x 3    COMMUNICATION/EDUCATION:   The patients plan of care was discussed with: Physical Therapist, Occupational Therapist, and Registered Nurse. Patient is unable to participate in goal setting and plan of care.     Thank you for this referral.  Laura Shoemaker, PT, DPT   Time Calculation: 23 mins

## 2020-02-28 NOTE — PROGRESS NOTES
Problem: Self Care Deficits Care Plan (Adult)  Goal: *Acute Goals and Plan of Care (Insert Text)  Description    FUNCTIONAL STATUS PRIOR TO ADMISSION: Patient lives in [address] [facility] unit at [facility]. Unclear level of assistance for basic ADL tasks. Most likely ambulatory as she was able to walk during eval with minimal assistance. Occupational Therapy Goals  Initiated 2/28/2020  1. Patient will perform self-feeding with supervision/set-up within 7 day(s). 2.  Patient will perform grooming with supervision/set-up within 7 day(s). 3.  Patient will perform upper body dressing with minimal assistance/contact guard assist within 7 day(s). 4.  Patient will perform toilet transfers with supervision/set-up within 7 day(s). Outcome: Progressing Towards Goal   OCCUPATIONAL THERAPY EVALUATION  Patient: Michelle Arrington (58 y.o. female)  Date: 2/28/2020  Primary Diagnosis: Acute CVA (cerebrovascular accident) [facility]) [I63.9]        Precautions:  Bed Alarm, Fall    ASSESSMENT  Based on the objective data described below, the patient presents with impaired mobility, balance and R  weakness as well as baseline dementia impacting her ability to complete basic ADL tasks. Oriented to self only. Poor novel command follow, increased with functional tasks. Her speech was not coherent to conversation and constant through out session. Unable to complete vision assessment secondary to confusion. Ambulated to bathroom with CGA-min AX 2 with B HHA, extensive multi-modal cues for command follow to sit on toilet. Incontinent of urine, total A for hygiene and protective underwear. Difficulty with eating and grooming tasks as it appears she is R hand dominant and unable to hold silverware or comb. Unable to complete [employer] or basic strength/coordination testing secondary to confusion      Recommend finger food for meal and assistance with meals. High fall risk with confusion/dementia.   Recommend bed alarm and near nurses station. Discussed with nurse and nurse manager. Current Level of Function Impacting Discharge (ADLs/self-care): max-total A    Functional Outcome Measure: The patient scored 5/100 on the Barthel Index outcome measure which is indicative of severe impairments with ADL tasks and functional mobility. Other factors to consider for discharge: Patient with dementia, from memory care unit. Patient will benefit from skilled therapy intervention to address the above noted impairments. PLAN :  Recommendations and Planned Interventions: self care training, functional mobility training, therapeutic exercise, balance training, therapeutic activities, endurance activities, patient education, home safety training, and family training/education    Frequency/Duration: Patient will be followed by occupational therapy 3 times a week to address goals. Recommendation for discharge: (in order for the patient to meet his/her long term goals)  To be determined: return to memory care unit     This discharge recommendation:  Has not yet been discussed the attending provider and/or case management    IF patient discharges home will need the following DME: TBD with progression and dc plans       SUBJECTIVE:   Patient stated I don't know where to sit.     OBJECTIVE DATA SUMMARY:   HISTORY:   No past medical history on file. No past surgical history on file. Expanded or extensive additional review of patient history:     Home Situation  Home Environment: [address] Name: Memory Care at The [address] dominance: Right--attempting ADL tasks with R UE    EXAMINATION OF PERFORMANCE DEFICITS:  Cognitive/Behavioral Status:  Neurologic State: Confused  Orientation Level: Oriented to person;Disoriented to time;Disoriented to situation;Disoriented to place  Cognition: Decreased attention/concentration;Decreased command following; Impaired decision making; Impulsive;Poor safety awareness Vision/Perceptual:                           Acuity: (unable to assess with confusion)         Range of Motion:  AROM: Generally decreased, functional                         Strength:  Strength: Generally decreased, functional                Coordination:     Fine Motor Skills-Upper: Right Impaired;Left Intact    Gross Motor Skills-Upper: Right Impaired;Left Intact    Tone & Sensation:   Unable to assess sensation secondary to confusion                         Balance:  Sitting: Intact  Standing: Impaired  Standing - Static: Constant support; Fair  Standing - Dynamic : Constant support; Fair    Functional Mobility and Transfers for ADLs:  Bed Mobility:  Rolling: Supervision  Supine to Sit: Supervision  Sit to Supine: Supervision  Scooting: Supervision    Transfers:  Sit to Stand: Minimum assistance;Assist x1  Stand to Sit: Contact guard assistance;Assist x1  Bathroom Mobility: Minimum assistance(x2)  Toilet Transfer : Minimum assistance;Assist x2    ADL Assessment:  Feeding: Maximum assistance; Total assistance    Oral Facial Hygiene/Grooming: Moderate assistance;Maximum assistance    Bathing: Maximum assistance    Upper Body Dressing: Maximum assistance    Lower Body Dressing: Total assistance    Toileting: Maximum assistance                ADL Intervention and task modifications:  Feeding  Food to Mouth: (L hand with finger food only)    Grooming  Brushing/Combing Hair: Maximum assistance(unable to hold comb with R hand)    Lower Body Dressing Assistance  Protective Undergarmet: Total assistance (dependent)  Functional Measure:  Barthel Index:    Bathin  Bladder: 0  Bowels: 0  Groomin  Dressin  Feedin  Mobility: 0  Stairs: 0  Toilet Use: 0  Transfer (Bed to Chair and Back): 5  Total: 5/100        The Barthel ADL Index: Guidelines  1. The index should be used as a record of what a patient does, not as a record of what a patient could do.   2. The main aim is to establish degree of independence from any help, physical or verbal, however minor and for whatever reason. 3. The need for supervision renders the patient not independent. 4. A patient's performance should be established using the best available evidence. Asking the patient, friends/relatives and nurses are the usual sources, but direct observation and common sense are also important. However direct testing is not needed. 5. Usually the patient's performance over the preceding 24-48 hours is important, but occasionally longer periods will be relevant. 6. Middle categories imply that the patient supplies over 50 per cent of the effort. 7. Use of aids to be independent is allowed. Alejandro Knight., Barthel, LAURAW. (9184). Functional evaluation: the Barthel Index. [facility] (14)2. Opal Freire renee SHELLY Anderson, Jesus Ly., Delfino Shell., [place], [address] (1999). Measuring the change indisability after inpatient rehabilitation; comparison of the responsiveness of the Barthel Index and Functional [place] Measure. Journal of Neurology, Neurosurgery, and Psychiatry, 66(4), 550-030. Griselda Solomon, N.J.A, KAYE Otoole, & Hien Deal, M.A. (2004.) Assessment of post-stroke quality of life in cost-effectiveness studies: The usefulness of the Barthel Index and the EuroQoL-5D.  Quality of Life Research, 15, 746-36         Occupational Therapy Evaluation Charge Determination   History Examination Decision-Making   LOW Complexity : Brief history review  LOW Complexity : 1-3 performance deficits relating to physical, cognitive , or psychosocial skils that result in activity limitations and / or participation restrictions  LOW Complexity : No comorbidities that affect functional and no verbal or physical assistance needed to complete eval tasks       Based on the above components, the patient evaluation is determined to be of the following complexity level: LOW   Pain Rating:  No c/o pain, no s/s of pain    Activity Tolerance:   Fair  Please refer to the flowsheet for vital signs taken during this treatment. After treatment patient left in no apparent distress:    Supine in bed, Call bell within reach, Bed / chair alarm activated, and Side rails x 3    COMMUNICATION/EDUCATION:   The patients plan of care was discussed with: Physical Therapist and Registered Nurse. Patient is unable to participate in goal setting and plan of care. This patients plan of care is appropriate for delegation to [facility].     Thank you for this referral.  Tyler Hodge OT  Time Calculation: 23 mins

## 2020-02-28 NOTE — PROGRESS NOTES
[address] Adult  Hospitalist Group                                                                                          Hospitalist Progress Note  [address] [facility], DO  Answering service: [phone] from in house phone        Date of Service:  2020  NAME:  Junior Garcia  :  1945  MRN:  [id number]      Admission Summary:   27-year-old female with past medical history dementia presenting to [facility] with fall.       Interval history / Subjective:   Patient seen and examined. Pleasantly confused. No further complaints. [address] neurology. Assessment & Plan: Altered mental status  Progressive dementia  Fall   -Appreciate neurology  -CTA, CT head negative  -Unlikely to tolerate MRI due to mentation  -continue aspirin  -patient resides at memory care unit  -anticipate patient at her baseline    Hyponatremia: continue IVFs   Hypokalemia: replete as needed  Hypertension: holding home antihypertensives    Code status: full   DVT prophylaxis: heparin    Care Plan discussed with: Nurse  Anticipated Disposition: SNF/LTC  Anticipated Discharge: Less than 24 hours     Hospital Problems  Never Reviewed          Codes Class Noted POA    Acute CVA (cerebrovascular accident) [facility]) ICD-10-CM: I63.9  ICD-9-CM: 434.91  2020 Unknown                Review of Systems:   Negative unless stated above      Vital Signs:    Last 24hrs VS reviewed since prior progress note. Most recent are:  Visit Vitals  /65 (BP 1 Location: Right arm, BP Patient Position: At rest)   Pulse 94   Temp 98.9 °F (37.2 °C)   Resp 18   Wt 47.9 kg (105 lb 9.6 oz)   SpO2 97%         Intake/Output Summary (Last 24 hours) at 2020 1732  Last data filed at 2020 1305  Gross per 24 hour   Intake 1108 ml   Output    Net 1108 ml        Physical Examination:             Constitutional:  No acute distress, cooperative, pleasant, confused   ENT:  Oral mucosa moist, oropharynx benign.     Resp:  CTA bilaterally. No wheezing/rhonchi/rales. No accessory muscle use   CV:  Regular rhythm, normal rate, no murmurs, gallops, rubs    GI:  Soft, non distended, non tender    Musculoskeletal:  No edema, warm, 2+ pulses throughout    Neurologic:  Moves all extremities. Data Review:    Review and/or order of clinical lab test      Labs:     Recent Labs     02/28/20  0408 02/27/20  1323   WBC 4.3 5.4   HGB 12.6 13.0   HCT 36.8 38.4    162     Recent Labs     02/28/20  0408 02/27/20  1323   * 129*   K 3.2* 3.3*   CL 97 96*   CO2 26 26   BUN 16 14   CREA 0.69 0.63   * 102*   CA 8.3* 8.1*   MG 2.0  --      Recent Labs     02/28/20  0408 02/27/20  1323   SGOT 27 23   ALT 23 21   AP 67 79   TBILI 0.8 0.9   TP 6.6 6.7   ALB 3.3* 3.4*   GLOB 3.3 3.3     Recent Labs     02/28/20  0408 02/27/20  1323   INR 1.2* 1.1   PTP 11.7* 11.4*   APTT  --  25.0      No results for input(s): FE, TIBC, PSAT, FERR in the last 72 hours. No results found for: FOL, RBCF   No results for input(s): PH, PCO2, PO2 in the last 72 hours.   Recent Labs     02/27/20  1708 02/27/20  1323   TROIQ <0.05 <0.05     Lab Results   Component Value Date/Time    Cholesterol, total 218 (H) 02/28/2020 04:08 AM    HDL Cholesterol 66 02/28/2020 04:08 AM    LDL, calculated 137.6 (H) 02/28/2020 04:08 AM    Triglyceride 72 02/28/2020 04:08 AM    CHOL/HDL Ratio 3.3 02/28/2020 04:08 AM     Lab Results   Component Value Date/Time    Glucose (POC) 94 02/27/2020 01:30 PM     Lab Results   Component Value Date/Time    Color YELLOW/STRAW 02/27/2020 01:35 PM    Appearance CLEAR 02/27/2020 01:35 PM    Specific gravity 1.021 02/27/2020 01:35 PM    pH (UA) 6.5 02/27/2020 01:35 PM    Protein NEGATIVE  02/27/2020 01:35 PM    Glucose NEGATIVE  02/27/2020 01:35 PM    Ketone 15 (A) 02/27/2020 01:35 PM    Bilirubin NEGATIVE  02/27/2020 01:35 PM    Urobilinogen 0.2 02/27/2020 01:35 PM    Nitrites NEGATIVE  02/27/2020 01:35 PM    Leukocyte Esterase NEGATIVE 02/27/2020 01:35 PM    Epithelial cells FEW 02/27/2020 01:35 PM    Bacteria NEGATIVE  02/27/2020 01:35 PM    WBC 0-4 02/27/2020 01:35 PM    RBC 10-20 02/27/2020 01:35 PM         Medications Reviewed:     Current Facility-Administered Medications   Medication Dose Route Frequency    sodium chloride (NS) flush 5-40 mL  5-40 mL IntraVENous Q8H    sodium chloride (NS) flush 5-40 mL  5-40 mL IntraVENous PRN    acetaminophen (TYLENOL) tablet 650 mg  650 mg Oral Q6H PRN    ondansetron (ZOFRAN) injection 4 mg  4 mg IntraVENous Q4H PRN    docusate sodium (COLACE) capsule 100 mg  100 mg Oral BID    heparin (porcine) injection 5,000 Units  5,000 Units SubCUTAneous Q8H     ______________________________________________________________________  EXPECTED LENGTH OF STAY: - - -  ACTUAL LENGTH OF STAY:          [address] [facility],

## 2020-02-28 NOTE — PROGRESS NOTES
Transition of Care Plan     Disposition: Potentially return to Memory Care unit at the [facility]- number for report is [phone] and fax number for any discharge information is [phone]. This CM spoke with Libby Chaney at the [facility] who indicated that patient can return over the weekend. At the time of this initial assessment, MRI was still pending and unclear of discharge date.     RUR- 33%   Transport:  BLS   Continue Medical Care    Follow up with PCP/Specialist           Reason for Admission:   Acute CVA               RUR Score:     33% high    PCP: Patient sees PCP at the [facility]    Resources/supports as identified by patient/family:                   Top Challenges facing patient (as identified by patient/family and CM): Finances/Medication cost?      No concerns noted/ patient has a Trust that is managed by [employer]? [facility]              Support system or lack thereof? Patient lives in a [address] [phone]                     Living arrangements? See above           Self-care/ADLs/Cognition? Patient was not oriented at the time of this initial assessment. Current Advanced Directive/Advance Care Plan:  Full code/ AMD is on file                          Plan for utilizing home health:    TBD/ CM will need to follow                 Transition of Care Plan: This CM called [phone] - Brittany Keyla- AMD is on file. She stated that she would want patient to return to [facility] if appropriate. CM will need to follow for needs. PT/ OT evaluations are still pending.        Care Management Interventions  PCP Verified by CM: Yes(PCP is MD the [facility])  Palliative Care Criteria Met (RRAT>21 & CHF Dx)?: No  Mode of Transport at Discharge: (TBD/ CM will need to follow)  Transition of Care Consult (CM Consult): (Patient lives at the [address] [place] [address] Alfredo Rojas: No  Discharge Durable Medical Equipment: No  Health Maintenance Reviewed: Yes  Physical Therapy Consult: Yes  Occupational Therapy Consult: Yes  Speech Therapy Consult: No  Current Support Network: (Lives in [facility])  Confirm Follow Up Transport: Family  Discharge Location  Discharge Placement: (TBD/ CM will need to follow)    Ajay Dao  1:44 PM

## 2020-02-28 NOTE — PROGRESS NOTES
Problem: Pressure Injury - Risk of  Goal: *Prevention of pressure injury  Description  Document Delbert Scale and appropriate interventions in the flowsheet.   Outcome: Progressing Towards Goal  Note: Pressure Injury Interventions:  Sensory Interventions: Assess changes in LOC, Assess need for specialty bed, Keep linens dry and wrinkle-free, Pad between skin to skin, Pressure redistribution bed/mattress (bed type)    Moisture Interventions: Absorbent underpads, Apply protective barrier, creams and emollients, Check for incontinence Q2 hours and as needed, Internal/External urinary devices    Activity Interventions: Assess need for specialty bed, Chair cushion, Increase time out of bed, Pressure redistribution bed/mattress(bed type)    Mobility Interventions: Assess need for specialty bed, Pressure redistribution bed/mattress (bed type), PT/OT evaluation    Nutrition Interventions: Document food/fluid/supplement intake, Offer support with meals,snacks and hydration, Discuss nutritional consult with provider    Friction and Shear Interventions: Apply protective barrier, creams and emollients, HOB 30 degrees or less                Problem: TIA/CVA Stroke: 0-24 hours  Goal: Activity/Safety  Outcome: Progressing Towards Goal  Goal: Diagnostic Test/Procedures  Outcome: Progressing Towards Goal  Goal: Nutrition/Diet  Outcome: Progressing Towards Goal  Goal: Medications  Outcome: Progressing Towards Goal  Goal: Respiratory  Outcome: Progressing Towards Goal  Goal: Treatments/Interventions/Procedures  Outcome: Progressing Towards Goal  Goal: *Hemodynamically stable  Outcome: Progressing Towards Goal  Goal: *Neurologically stable  Description  Absence of additional neurological deficits    Outcome: Progressing Towards Goal  Goal: *Verbalizes anxiety and depression are reduced or absent  Outcome: Progressing Towards Goal  Goal: *Absence of Signs of Aspiration on Current Diet  Outcome: Progressing Towards Goal  Goal: *Absence of deep venous thrombosis signs and symptoms(Stroke Metric)  Outcome: Progressing Towards Goal  Goal: *Ability to perform ADLs and demonstrates progressive mobility and function  Outcome: Progressing Towards Goal  Goal: *Stroke education started(Stroke Metric)  Outcome: Progressing Towards Goal  Goal: *Dysphagia screen performed(Stroke Metric)  Outcome: Progressing Towards Goal

## 2020-02-29 ENCOUNTER — APPOINTMENT (OUTPATIENT)
Dept: MRI IMAGING | Age: 75
DRG: 689 | End: 2020-02-29
Attending: INTERNAL MEDICINE
Payer: MEDICARE

## 2020-02-29 LAB
ANION GAP SERPL CALC-SCNC: 6 MMOL/L (ref 5–15)
APPEARANCE UR: CLEAR
BACTERIA URNS QL MICRO: ABNORMAL /HPF
BILIRUB UR QL: NEGATIVE
BUN SERPL-MCNC: 11 MG/DL (ref 6–20)
BUN/CREAT SERPL: 18 (ref 12–20)
CALCIUM SERPL-MCNC: 8 MG/DL (ref 8.5–10.1)
CHLORIDE SERPL-SCNC: 100 MMOL/L (ref 97–108)
CO2 SERPL-SCNC: 27 MMOL/L (ref 21–32)
COLOR UR: ABNORMAL
CREAT SERPL-MCNC: 0.6 MG/DL (ref 0.55–1.02)
EPITH CASTS URNS QL MICRO: ABNORMAL /LPF
GLUCOSE SERPL-MCNC: 98 MG/DL (ref 65–100)
GLUCOSE UR STRIP.AUTO-MCNC: NEGATIVE MG/DL
HGB UR QL STRIP: ABNORMAL
KETONES UR QL STRIP.AUTO: 80 MG/DL
LEUKOCYTE ESTERASE UR QL STRIP.AUTO: ABNORMAL
NITRITE UR QL STRIP.AUTO: POSITIVE
PH UR STRIP: 5.5 [PH] (ref 5–8)
POTASSIUM SERPL-SCNC: 3.2 MMOL/L (ref 3.5–5.1)
PROT UR STRIP-MCNC: NEGATIVE MG/DL
RBC #/AREA URNS HPF: ABNORMAL /HPF (ref 0–5)
SODIUM SERPL-SCNC: 133 MMOL/L (ref 136–145)
SP GR UR REFRACTOMETRY: 1.02 (ref 1–1.03)
UA: UC IF INDICATED,UAUC: ABNORMAL
UROBILINOGEN UR QL STRIP.AUTO: 1 EU/DL (ref 0.2–1)
WBC URNS QL MICRO: ABNORMAL /HPF (ref 0–4)

## 2020-02-29 PROCEDURE — 74011250636 HC RX REV CODE- 250/636: Performed by: INTERNAL MEDICINE

## 2020-02-29 PROCEDURE — 70551 MRI BRAIN STEM W/O DYE: CPT

## 2020-02-29 PROCEDURE — 74011250637 HC RX REV CODE- 250/637: Performed by: INTERNAL MEDICINE

## 2020-02-29 PROCEDURE — 65660000000 HC RM CCU STEPDOWN

## 2020-02-29 PROCEDURE — 36415 COLL VENOUS BLD VENIPUNCTURE: CPT

## 2020-02-29 PROCEDURE — 80048 BASIC METABOLIC PNL TOTAL CA: CPT

## 2020-02-29 RX ORDER — POTASSIUM CHLORIDE 750 MG/1
40 TABLET, FILM COATED, EXTENDED RELEASE ORAL
Status: DISCONTINUED | OUTPATIENT
Start: 2020-02-29 | End: 2020-02-29 | Stop reason: SDUPTHER

## 2020-02-29 RX ORDER — CEFDINIR 300 MG/1
300 CAPSULE ORAL EVERY 12 HOURS
Status: DISCONTINUED | OUTPATIENT
Start: 2020-02-29 | End: 2020-03-02 | Stop reason: HOSPADM

## 2020-02-29 RX ADMIN — SODIUM CHLORIDE 75 ML/HR: 900 INJECTION, SOLUTION INTRAVENOUS at 01:35

## 2020-02-29 RX ADMIN — DOCUSATE SODIUM 100 MG: 100 CAPSULE, LIQUID FILLED ORAL at 08:26

## 2020-02-29 RX ADMIN — CEFDINIR 300 MG: 300 CAPSULE ORAL at 21:41

## 2020-02-29 RX ADMIN — Medication 10 ML: at 14:43

## 2020-02-29 RX ADMIN — Medication 10 ML: at 07:14

## 2020-02-29 RX ADMIN — HEPARIN SODIUM 5000 UNITS: 5000 INJECTION INTRAVENOUS; SUBCUTANEOUS at 01:38

## 2020-02-29 RX ADMIN — CEFDINIR 300 MG: 300 CAPSULE ORAL at 11:00

## 2020-02-29 RX ADMIN — ASPIRIN 81 MG 81 MG: 81 TABLET ORAL at 08:26

## 2020-02-29 RX ADMIN — POTASSIUM BICARBONATE 40 MEQ: 782 TABLET, EFFERVESCENT ORAL at 11:00

## 2020-02-29 RX ADMIN — HEPARIN SODIUM 5000 UNITS: 5000 INJECTION INTRAVENOUS; SUBCUTANEOUS at 17:29

## 2020-02-29 RX ADMIN — Medication 10 ML: at 21:41

## 2020-02-29 RX ADMIN — HEPARIN SODIUM 5000 UNITS: 5000 INJECTION INTRAVENOUS; SUBCUTANEOUS at 08:26

## 2020-02-29 RX ADMIN — DOCUSATE SODIUM 100 MG: 100 CAPSULE, LIQUID FILLED ORAL at 17:30

## 2020-02-29 NOTE — PROGRESS NOTES
[facility] Adult  Hospitalist Group                                                                                          Hospitalist Progress Note  [address] [facility],   Answering service: [phone] OR [phone] from in house phone        Date of Service:  2020  NAME:  Juan Diego Mei  :  1945  MRN:  [id number]      Admission Summary:   80-year-old female with past medical history dementia presenting to [facility] with fall.       Interval history / Subjective:   Patient seen and examined earlier this a.m. Pleasantly confused. Febrile overnight. Urinalysis concerning for UTI. Initiate antibiotics. Check respiratory viral panel. MRI negative. Discussed hospitalization with patient's mPOA and concern for patient's overall decline in setting of progressive dementia. Discussed recommendation for hospice. Plan to further discuss tomorrow pending full work-up. Assessment & Plan: Altered mental status  Progressive dementia  Fall   -Appreciate neurology  -CTA, CT head negative  -MRI negative  -continue aspirin  -patient resides at memory care unit  -anticipate patient at her baseline    Hyponatremia: continue IVFs   Hypokalemia: replete as needed  Hypertension: holding home antihypertensives    Code status: full. Needs addressed with mPOA  DVT prophylaxis: heparin    Care Plan discussed with: mPOA  Anticipated Disposition: SNF/LTC  Anticipated Discharge: Less than 24 hours     Hospital Problems  Never Reviewed          Codes Class Noted POA    Acute CVA (cerebrovascular accident) [facility]) ICD-10-CM: I63.9  ICD-9-CM: 434.91  2020 Unknown                Review of Systems:   Negative unless stated above      Vital Signs:    Last 24hrs VS reviewed since prior progress note.  Most recent are:  Visit Vitals  /69 (BP 1 Location: Left arm, BP Patient Position: At rest)   Pulse 87   Temp 98.2 °F (36.8 °C)   Resp 17   Wt 46 kg (101 lb 6.6 oz)   SpO2 99%         Intake/Output Summary (Last 24 hours) at 2/29/2020 1708  Last data filed at 2/29/2020 0400  Gross per 24 hour   Intake 837.5 ml   Output    Net 837.5 ml        Physical Examination:             Constitutional:  No acute distress, cooperative, pleasant, confused   ENT:  Oral mucosa moist, oropharynx benign. Resp:  CTA bilaterally. No wheezing/rhonchi/rales. No accessory muscle use   CV:  Regular rhythm, normal rate, no murmurs, gallops, rubs    GI:  Soft, non distended, non tender    Musculoskeletal:  No edema, warm, 2+ pulses throughout    Neurologic:  Moves all extremities. Data Review:    Review and/or order of clinical lab test      Labs:     Recent Labs     02/28/20  0408 02/27/20  1323   WBC 4.3 5.4   HGB 12.6 13.0   HCT 36.8 38.4    162     Recent Labs     02/29/20  0327 02/28/20  0408 02/27/20  1323   * 129* 129*   K 3.2* 3.2* 3.3*    97 96*   CO2 27 26 26   BUN 11 16 14   CREA 0.60 0.69 0.63   GLU 98 104* 102*   CA 8.0* 8.3* 8.1*   MG  --  2.0  --      Recent Labs     02/28/20  0408 02/27/20  1323   SGOT 27 23   ALT 23 21   AP 67 79   TBILI 0.8 0.9   TP 6.6 6.7   ALB 3.3* 3.4*   GLOB 3.3 3.3     Recent Labs     02/28/20  0408 02/27/20  1323   INR 1.2* 1.1   PTP 11.7* 11.4*   APTT  --  25.0      No results for input(s): FE, TIBC, PSAT, FERR in the last 72 hours. No results found for: FOL, RBCF   No results for input(s): PH, PCO2, PO2 in the last 72 hours.   Recent Labs     02/27/20  1708 02/27/20  1323   TROIQ <0.05 <0.05     Lab Results   Component Value Date/Time    Cholesterol, total 218 (H) 02/28/2020 04:08 AM    HDL Cholesterol 66 02/28/2020 04:08 AM    LDL, calculated 137.6 (H) 02/28/2020 04:08 AM    Triglyceride 72 02/28/2020 04:08 AM    CHOL/HDL Ratio 3.3 02/28/2020 04:08 AM     Lab Results   Component Value Date/Time    Glucose (POC) 94 02/27/2020 01:30 PM     Lab Results   Component Value Date/Time    Color YELLOW/STRAW 02/28/2020 10:50 PM    Appearance CLEAR 02/28/2020 10:50 PM Specific gravity 1.025 02/28/2020 10:50 PM    pH (UA) 5.5 02/28/2020 10:50 PM    Protein NEGATIVE  02/28/2020 10:50 PM    Glucose NEGATIVE  02/28/2020 10:50 PM    Ketone 80 (A) 02/28/2020 10:50 PM    Bilirubin NEGATIVE  02/28/2020 10:50 PM    Urobilinogen 1.0 02/28/2020 10:50 PM    Nitrites POSITIVE (A) 02/28/2020 10:50 PM    Leukocyte Esterase SMALL (A) 02/28/2020 10:50 PM    Epithelial cells FEW 02/28/2020 10:50 PM    Bacteria 3+ (A) 02/28/2020 10:50 PM    WBC 20-50 02/28/2020 10:50 PM    RBC 5-10 02/28/2020 10:50 PM         Medications Reviewed:     Current Facility-Administered Medications   Medication Dose Route Frequency    cefdinir (OMNICEF) capsule 300 mg  300 mg Oral Q12H    aspirin chewable tablet 81 mg  81 mg Oral DAILY    sodium chloride (NS) flush 5-40 mL  5-40 mL IntraVENous Q8H    sodium chloride (NS) flush 5-40 mL  5-40 mL IntraVENous PRN    acetaminophen (TYLENOL) tablet 650 mg  650 mg Oral Q6H PRN    ondansetron (ZOFRAN) injection 4 mg  4 mg IntraVENous Q4H PRN    docusate sodium (COLACE) capsule 100 mg  100 mg Oral BID    heparin (porcine) injection 5,000 Units  5,000 Units SubCUTAneous Q8H     ______________________________________________________________________  EXPECTED LENGTH OF STAY: - - -  ACTUAL LENGTH OF STAY:          2                 Qi Regalado DO

## 2020-02-29 NOTE — PROGRESS NOTES
Bedside and Verbal shift change report given to [address] Kristian Pickens (oncoming nurse) by COREY Jenkins RN (offgoing nurse). Report given with SBAR, Kardex, Intake/Output, MAR and Recent Results.

## 2020-02-29 NOTE — PROGRESS NOTES
Patient's temp has been trending up, now 100.8. Tylenol given and Moisés notified. He ordered a chest xray and urinalysis. Since patient is incontinent, Paula Singh said we could straight cath her.

## 2020-02-29 NOTE — PROGRESS NOTES
Bedside and Verbal shift change report given to [facility] (oncoming nurse) by Gilmer Palomino (offgoing nurse). Report included the following information SBAR, Kardex, Intake/Output, MAR, Cardiac Rhythm NSR and Dual Neuro Assessment.

## 2020-02-29 NOTE — PROGRESS NOTES
Problem: Pressure Injury - Risk of  Goal: *Prevention of pressure injury  Description  Document Delbert Scale and appropriate interventions in the flowsheet. Outcome: Progressing Towards Goal  Note: Pressure Injury Interventions:  Sensory Interventions: Assess changes in LOC, Float heels, Keep linens dry and wrinkle-free, Maintain/enhance activity level, Minimize linen layers, Monitor skin under medical devices    Moisture Interventions: Absorbent underpads, Minimize layers    Activity Interventions: Increase time out of bed, Pressure redistribution bed/mattress(bed type)    Mobility Interventions: Float heels, HOB 30 degrees or less, PT/OT evaluation    Nutrition Interventions: Document food/fluid/supplement intake    Friction and Shear Interventions: Lift sheet, Minimize layers                Problem: TIA/CVA Stroke: Day 2 Until Discharge  Goal: Activity/Safety  Outcome: Progressing Towards Goal  Goal: Diagnostic Test/Procedures  Outcome: Progressing Towards Goal  Goal: Nutrition/Diet  Outcome: Progressing Towards Goal  Goal: Discharge Planning  Outcome: Progressing Towards Goal  Goal: Medications  Outcome: Progressing Towards Goal  Goal: Respiratory  Outcome: Progressing Towards Goal  Goal: Treatments/Interventions/Procedures  Outcome: Progressing Towards Goal  Goal: Psychosocial  Outcome: Progressing Towards Goal  Goal: *Verbalizes anxiety and depression are reduced or absent  Outcome: Progressing Towards Goal  Goal: *Absence of aspiration  Outcome: Progressing Towards Goal  Goal: *Optimal pain control at patient's stated goal  Outcome: Progressing Towards Goal  Goal: *Tolerating diet  Outcome: Progressing Towards Goal  Goal: *Stroke education continued(Stroke Metric)  Outcome: Progressing Towards Goal     Problem: Falls - Risk of  Goal: *Absence of Falls  Description  Document Soledad Fall Risk and appropriate interventions in the flowsheet.   Outcome: Progressing Towards Goal  Note: Fall Risk Interventions: Mentation Interventions: Adequate sleep, hydration, pain control, Bed/chair exit alarm, Reorient patient, Room close to nurse's station    Medication Interventions: Bed/chair exit alarm, Patient to call before getting OOB, Teach patient to arise slowly    Elimination Interventions: Bed/chair exit alarm, Call light in reach, Patient to call for help with toileting needs    History of Falls Interventions: Bed/chair exit alarm, Consult care management for discharge planning, Room close to nurse's station         Problem: Discharge Planning  Goal: *Discharge to safe environment  Outcome: Progressing Towards Goal     Problem: Nutrition Deficit  Goal: *Optimize nutritional status  Outcome: Progressing Towards Goal

## 2020-02-29 NOTE — PROGRESS NOTES
Bedside and Verbal shift change report given to Ling Negron RN (oncoming nurse) by Abran Rodríguez RN (offgoing nurse). Report included the following information SBAR, Kardex, Intake/Output, MAR, Recent Results and Cardiac Rhythm NSR.

## 2020-03-01 LAB
ANION GAP SERPL CALC-SCNC: 6 MMOL/L (ref 5–15)
B PERT DNA SPEC QL NAA+PROBE: NOT DETECTED
BORDETELLA PARAPERTUSSIS PCR, BORPAR: NOT DETECTED
BUN SERPL-MCNC: 9 MG/DL (ref 6–20)
BUN/CREAT SERPL: 20 (ref 12–20)
C PNEUM DNA SPEC QL NAA+PROBE: NOT DETECTED
CALCIUM SERPL-MCNC: 8 MG/DL (ref 8.5–10.1)
CHLORIDE SERPL-SCNC: 100 MMOL/L (ref 97–108)
CO2 SERPL-SCNC: 26 MMOL/L (ref 21–32)
CREAT SERPL-MCNC: 0.44 MG/DL (ref 0.55–1.02)
ERYTHROCYTE [DISTWIDTH] IN BLOOD BY AUTOMATED COUNT: 11.5 % (ref 11.5–14.5)
FLUAV H1 2009 PAND RNA SPEC QL NAA+PROBE: NOT DETECTED
FLUAV H1 RNA SPEC QL NAA+PROBE: NOT DETECTED
FLUAV H3 RNA SPEC QL NAA+PROBE: NOT DETECTED
FLUAV SUBTYP SPEC NAA+PROBE: NOT DETECTED
FLUBV RNA SPEC QL NAA+PROBE: NOT DETECTED
GLUCOSE SERPL-MCNC: 113 MG/DL (ref 65–100)
HADV DNA SPEC QL NAA+PROBE: NOT DETECTED
HCOV 229E RNA SPEC QL NAA+PROBE: NOT DETECTED
HCOV HKU1 RNA SPEC QL NAA+PROBE: NOT DETECTED
HCOV NL63 RNA SPEC QL NAA+PROBE: NOT DETECTED
HCOV OC43 RNA SPEC QL NAA+PROBE: NOT DETECTED
HCT VFR BLD AUTO: 33.8 % (ref 35–47)
HGB BLD-MCNC: 11.8 G/DL (ref 11.5–16)
HMPV RNA SPEC QL NAA+PROBE: DETECTED
HPIV1 RNA SPEC QL NAA+PROBE: NOT DETECTED
HPIV2 RNA SPEC QL NAA+PROBE: NOT DETECTED
HPIV3 RNA SPEC QL NAA+PROBE: NOT DETECTED
HPIV4 RNA SPEC QL NAA+PROBE: NOT DETECTED
M PNEUMO DNA SPEC QL NAA+PROBE: NOT DETECTED
MCH RBC QN AUTO: 30.8 PG (ref 26–34)
MCHC RBC AUTO-ENTMCNC: 34.9 G/DL (ref 30–36.5)
MCV RBC AUTO: 88.3 FL (ref 80–99)
NRBC # BLD: 0 K/UL (ref 0–0.01)
NRBC BLD-RTO: 0 PER 100 WBC
PLATELET # BLD AUTO: 142 K/UL (ref 150–400)
PMV BLD AUTO: 10.4 FL (ref 8.9–12.9)
POTASSIUM SERPL-SCNC: 3.2 MMOL/L (ref 3.5–5.1)
RBC # BLD AUTO: 3.83 M/UL (ref 3.8–5.2)
RSV RNA SPEC QL NAA+PROBE: NOT DETECTED
RV+EV RNA SPEC QL NAA+PROBE: NOT DETECTED
SODIUM SERPL-SCNC: 132 MMOL/L (ref 136–145)
WBC # BLD AUTO: 4.7 K/UL (ref 3.6–11)

## 2020-03-01 PROCEDURE — 74011250637 HC RX REV CODE- 250/637: Performed by: INTERNAL MEDICINE

## 2020-03-01 PROCEDURE — 80048 BASIC METABOLIC PNL TOTAL CA: CPT

## 2020-03-01 PROCEDURE — 85027 COMPLETE CBC AUTOMATED: CPT

## 2020-03-01 PROCEDURE — 65660000000 HC RM CCU STEPDOWN

## 2020-03-01 PROCEDURE — 36415 COLL VENOUS BLD VENIPUNCTURE: CPT

## 2020-03-01 PROCEDURE — 74011250636 HC RX REV CODE- 250/636: Performed by: INTERNAL MEDICINE

## 2020-03-01 PROCEDURE — 0100U RESPIRATORY PANEL,PCR,NASOPHARYNGEAL: CPT

## 2020-03-01 RX ADMIN — Medication 10 ML: at 14:33

## 2020-03-01 RX ADMIN — Medication 10 ML: at 20:59

## 2020-03-01 RX ADMIN — CEFDINIR 300 MG: 300 CAPSULE ORAL at 20:58

## 2020-03-01 RX ADMIN — DOCUSATE SODIUM 100 MG: 100 CAPSULE, LIQUID FILLED ORAL at 08:16

## 2020-03-01 RX ADMIN — HEPARIN SODIUM 5000 UNITS: 5000 INJECTION INTRAVENOUS; SUBCUTANEOUS at 01:21

## 2020-03-01 RX ADMIN — HEPARIN SODIUM 5000 UNITS: 5000 INJECTION INTRAVENOUS; SUBCUTANEOUS at 08:15

## 2020-03-01 RX ADMIN — ASPIRIN 81 MG 81 MG: 81 TABLET ORAL at 08:16

## 2020-03-01 RX ADMIN — HEPARIN SODIUM 5000 UNITS: 5000 INJECTION INTRAVENOUS; SUBCUTANEOUS at 17:46

## 2020-03-01 RX ADMIN — DOCUSATE SODIUM 100 MG: 100 CAPSULE, LIQUID FILLED ORAL at 17:47

## 2020-03-01 RX ADMIN — Medication 10 ML: at 06:00

## 2020-03-01 RX ADMIN — CEFDINIR 300 MG: 300 CAPSULE ORAL at 08:16

## 2020-03-01 RX ADMIN — POTASSIUM BICARBONATE 40 MEQ: 782 TABLET, EFFERVESCENT ORAL at 09:44

## 2020-03-01 NOTE — PROGRESS NOTES
Problem: TIA/CVA Stroke: Day 2 Until Discharge  Goal: Nutrition/Diet  Outcome: Progressing Towards Goal   Patient tolerating current diet well

## 2020-03-01 NOTE — PROGRESS NOTES
Bedside shift change report given to Roma Cranker, RN (oncoming nurse) by Facundo Souza RN (offgoing nurse). Report included the following information SBAR, Kardex, Intake/Output, MAR, Recent Results, Cardiac Rhythm NSR and Dual Neuro Assessment.

## 2020-03-01 NOTE — PROGRESS NOTES
[facility]                                                                                          Hospitalist Progress Note  [address] [facility],   Answering service: [phone] from in house phone        Date of Service:  3/1/2020  NAME:  Michelle Arrington  :  1945  MRN:  [id number]      Admission Summary:   59-year-old female with past medical history dementia presenting to [facility] with fall.       Interval history / Subjective:   Patient seen and examined. No acute events. Temp 100.1. Respiratory viral panel with metapneumovirus. Awaiting urine culture results. Assessment & Plan:     Acute metabolic encephalopathy: improving  UTI, GNR:  Fever:  Metapneumovirus:   -continue keflex, follow urine cultures    Progressive dementia  Fall   -Appreciate neurology  -CTA, CT head negative  -MRI negative  -continue aspirin  -patient resides at memory care unit  -anticipate patient at her baseline  -may be appropriate for hospice, discussed with Brandy    Hyponatremia: monitor off IVFs   Hypokalemia: replete as needed  Hypertension: holding home antihypertensives    Code status: full. Needs addressed with Brandy  DVT prophylaxis: heparin    Care Plan discussed with: Brandy  Anticipated Disposition: SNF/LTC   Anticipated Discharge: Less than 24 hours     Hospital Problems  Never Reviewed          Codes Class Noted POA    Acute CVA (cerebrovascular accident) [facility]) ICD-10-CM: I63.9  ICD-9-CM: 434.91  2020 Unknown                Review of Systems:   Negative unless stated above      Vital Signs:    Last 24hrs VS reviewed since prior progress note.  Most recent are:  Visit Vitals  /63   Pulse 94   Temp 99.3 °F (37.4 °C)   Resp 23   Wt 43.7 kg (96 lb 5.5 oz)   SpO2 91%   BMI 16.03 kg/m²       No intake or output data in the 24 hours ending 20 1510     Physical Examination:             Constitutional:  No acute distress, cooperative, pleasant, confused ENT:  Oral mucosa moist, oropharynx benign. Resp:  CTA bilaterally. No wheezing/rhonchi/rales. No accessory muscle use   CV:  Regular rhythm, normal rate, no murmurs, gallops, rubs    GI:  Soft, non distended, non tender    Musculoskeletal:  No edema, warm, 2+ pulses throughout    Neurologic:  Moves all extremities. Data Review:    Review and/or order of clinical lab test      Labs:     Recent Labs     03/01/20  0115 02/28/20  0408   WBC 4.7 4.3   HGB 11.8 12.6   HCT 33.8* 36.8   * 160     Recent Labs     03/01/20  0115 02/29/20  0327 02/28/20  0408   * 133* 129*   K 3.2* 3.2* 3.2*    100 97   CO2 26 27 26   BUN 9 11 16   CREA 0.44* 0.60 0.69   * 98 104*   CA 8.0* 8.0* 8.3*   MG  --   --  2.0     Recent Labs     02/28/20  0408   SGOT 27   ALT 23   AP 67   TBILI 0.8   TP 6.6   ALB 3.3*   GLOB 3.3     Recent Labs     02/28/20  0408   INR 1.2*   PTP 11.7*      No results for input(s): FE, TIBC, PSAT, FERR in the last 72 hours. No results found for: FOL, RBCF   No results for input(s): PH, PCO2, PO2 in the last 72 hours.   Recent Labs     02/27/20  1708   TROIQ <0.05     Lab Results   Component Value Date/Time    Cholesterol, total 218 (H) 02/28/2020 04:08 AM    HDL Cholesterol 66 02/28/2020 04:08 AM    LDL, calculated 137.6 (H) 02/28/2020 04:08 AM    Triglyceride 72 02/28/2020 04:08 AM    CHOL/HDL Ratio 3.3 02/28/2020 04:08 AM     Lab Results   Component Value Date/Time    Glucose (POC) 94 02/27/2020 01:30 PM     Lab Results   Component Value Date/Time    Color YELLOW/STRAW 02/28/2020 10:50 PM    Appearance CLEAR 02/28/2020 10:50 PM    Specific gravity 1.025 02/28/2020 10:50 PM    pH (UA) 5.5 02/28/2020 10:50 PM    Protein NEGATIVE  02/28/2020 10:50 PM    Glucose NEGATIVE  02/28/2020 10:50 PM    Ketone 80 (A) 02/28/2020 10:50 PM    Bilirubin NEGATIVE  02/28/2020 10:50 PM    Urobilinogen 1.0 02/28/2020 10:50 PM    Nitrites POSITIVE (A) 02/28/2020 10:50 PM    Leukocyte Esterase SMALL (A) 02/28/2020 10:50 PM    Epithelial cells FEW 02/28/2020 10:50 PM    Bacteria 3+ (A) 02/28/2020 10:50 PM    WBC 20-50 02/28/2020 10:50 PM    RBC 5-10 02/28/2020 10:50 PM         Medications Reviewed:     Current Facility-Administered Medications   Medication Dose Route Frequency    cefdinir (OMNICEF) capsule 300 mg  300 mg Oral Q12H    aspirin chewable tablet 81 mg  81 mg Oral DAILY    sodium chloride (NS) flush 5-40 mL  5-40 mL IntraVENous Q8H    sodium chloride (NS) flush 5-40 mL  5-40 mL IntraVENous PRN    acetaminophen (TYLENOL) tablet 650 mg  650 mg Oral Q6H PRN    ondansetron (ZOFRAN) injection 4 mg  4 mg IntraVENous Q4H PRN    docusate sodium (COLACE) capsule 100 mg  100 mg Oral BID    heparin (porcine) injection 5,000 Units  5,000 Units SubCUTAneous Q8H     ______________________________________________________________________  EXPECTED LENGTH OF STAY: - - -  ACTUAL LENGTH OF STAY:          [id number] G[address] Silverio DO

## 2020-03-01 NOTE — PROGRESS NOTES
Transitions of Care:     -Patient to discharge back to Memory Care Unit at [facility] transport    The CM spoke with Attending MD- anticipate discharge 3/2- MD wanted CM to call Memory Care to see what hospice company they utilize in the case the patient needs to transition to hospice in the future. The CM called [facility] and spoke with  Go Fang RN- she confirmed ability to accept patient back to facility tomorrow, 3/2. BLS transport anticipated. Unit  to call tomorrow morning, 3/2, to confirm details with facility. The CM noted Palliative anticipated to also meet with family on Monday. IF hospice is ordered, the [address] as primary provider- no hospice orders or consult at this time. CM will monitor.  [facility]

## 2020-03-01 NOTE — PROGRESS NOTES
Problem: Pressure Injury - Risk of  Goal: *Prevention of pressure injury  Description  Document Delbert Scale and appropriate interventions in the flowsheet.   Outcome: Progressing Towards Goal  Note: Pressure Injury Interventions:  Sensory Interventions: Assess changes in LOC, Discuss PT/OT consult with provider    Moisture Interventions: Absorbent underpads, Check for incontinence Q2 hours and as needed, Limit adult briefs    Activity Interventions: Increase time out of bed, PT/OT evaluation    Mobility Interventions: Chair cushion, HOB 30 degrees or less, PT/OT evaluation    Nutrition Interventions: Document food/fluid/supplement intake    Friction and Shear Interventions: Apply protective barrier, creams and emollients, HOB 30 degrees or less, Transferring/repositioning devices                Problem: Patient Education: Go to Patient Education Activity  Goal: Patient/Family Education  Outcome: Progressing Towards Goal     Problem: Patient Education: Go to Patient Education Activity  Goal: Patient/Family Education  Outcome: Progressing Towards Goal     Problem: TIA/CVA Stroke: 0-24 hours  Goal: Off Pathway (Use only if patient is Off Pathway)  Outcome: Progressing Towards Goal  Goal: Activity/Safety  Outcome: Progressing Towards Goal  Goal: Consults, if ordered  Outcome: Progressing Towards Goal  Goal: Diagnostic Test/Procedures  Outcome: Progressing Towards Goal  Goal: Nutrition/Diet  Outcome: Progressing Towards Goal  Goal: Discharge Planning  Outcome: Progressing Towards Goal  Goal: Medications  Outcome: Progressing Towards Goal  Goal: Respiratory  Outcome: Progressing Towards Goal  Goal: Treatments/Interventions/Procedures  Outcome: Progressing Towards Goal  Goal: Minimize risk of bleeding post-thrombolytic infusion  Outcome: Progressing Towards Goal  Goal: Monitor for complications post-thrombolytic infusion  Outcome: Progressing Towards Goal  Goal: Psychosocial  Outcome: Progressing Towards Goal  Goal: *Hemodynamically stable  Outcome: Progressing Towards Goal  Goal: *Neurologically stable  Description  Absence of additional neurological deficits    Outcome: Progressing Towards Goal  Goal: *Verbalizes anxiety and depression are reduced or absent  Outcome: Progressing Towards Goal  Goal: *Absence of Signs of Aspiration on Current Diet  Outcome: Progressing Towards Goal  Goal: *Absence of deep venous thrombosis signs and symptoms(Stroke Metric)  Outcome: Progressing Towards Goal  Goal: *Ability to perform ADLs and demonstrates progressive mobility and function  Outcome: Progressing Towards Goal  Goal: *Stroke education started(Stroke Metric)  Outcome: Progressing Towards Goal  Goal: *Dysphagia screen performed(Stroke Metric)  Outcome: Progressing Towards Goal  Goal: *Rehab consulted(Stroke Metric)  Outcome: Progressing Towards Goal     Problem: TIA/CVA Stroke: Day 2 Until Discharge  Goal: Off Pathway (Use only if patient is Off Pathway)  Outcome: Progressing Towards Goal  Goal: Activity/Safety  Outcome: Progressing Towards Goal  Goal: Diagnostic Test/Procedures  Outcome: Progressing Towards Goal  Goal: Nutrition/Diet  Outcome: Progressing Towards Goal  Goal: Discharge Planning  Outcome: Progressing Towards Goal  Goal: Medications  Outcome: Progressing Towards Goal  Goal: Respiratory  Outcome: Progressing Towards Goal  Goal: Treatments/Interventions/Procedures  Outcome: Progressing Towards Goal  Goal: Psychosocial  Outcome: Progressing Towards Goal  Goal: *Verbalizes anxiety and depression are reduced or absent  Outcome: Progressing Towards Goal  Goal: *Absence of aspiration  Outcome: Progressing Towards Goal  Goal: *Absence of deep venous thrombosis signs and symptoms(Stroke Metric)  Outcome: Progressing Towards Goal  Goal: *Optimal pain control at patient's stated goal  Outcome: Progressing Towards Goal  Goal: *Tolerating diet  Outcome: Progressing Towards Goal  Goal: *Ability to perform ADLs and demonstrates progressive mobility and function  Outcome: Progressing Towards Goal  Goal: *Stroke education continued(Stroke Metric)  Outcome: Progressing Towards Goal     Problem: Ischemic Stroke: Discharge Outcomes  Goal: *Verbalizes anxiety and depression are reduced or absent  Outcome: Progressing Towards Goal  Goal: *Verbalize understanding of risk factor modification(Stroke Metric)  Outcome: Progressing Towards Goal  Goal: *Hemodynamically stable  Outcome: Progressing Towards Goal  Goal: *Absence of aspiration pneumonia  Outcome: Progressing Towards Goal  Goal: *Aware of needed dietary changes  Outcome: Progressing Towards Goal  Goal: *Verbalize understanding of prescribed medications including anti-coagulants, anti-lipid, and/or anti-platelets(Stroke Metric)  Outcome: Progressing Towards Goal  Goal: *Tolerating diet  Outcome: Progressing Towards Goal  Goal: *Aware of follow-up diagnostics related to anticoagulants  Outcome: Progressing Towards Goal  Goal: *Ability to perform ADLs and demonstrates progressive mobility and function  Outcome: Progressing Towards Goal  Goal: *Absence of DVT(Stroke Metric)  Outcome: Progressing Towards Goal  Goal: *Absence of aspiration  Outcome: Progressing Towards Goal  Goal: *Optimal pain control at patient's stated goal  Outcome: Progressing Towards Goal  Goal: *Home safety concerns addressed  Outcome: Progressing Towards Goal  Goal: *Describes available resources and support systems  Outcome: Progressing Towards Goal  Goal: *Verbalizes understanding of activation of EMS(911) for stroke symptoms(Stroke Metric)  Outcome: Progressing Towards Goal  Goal: *Understands and describes signs and symptoms to report to providers(Stroke Metric)  Outcome: Progressing Towards Goal  Goal: *Neurolgocially stable (absence of additional neurological deficits)  Outcome: Progressing Towards Goal  Goal: *Verbalizes importance of follow-up with primary care physician(Stroke Metric)  Outcome: Progressing Towards Goal  Goal: *Smoking cessation discussed,if applicable(Stroke Metric)  Outcome: Progressing Towards Goal  Goal: *Depression screening completed(Stroke Metric)  Outcome: Progressing Towards Goal     Problem: Falls - Risk of  Goal: *Absence of Falls  Description  Document Juliocesar Haynes Fall Risk and appropriate interventions in the flowsheet.   Outcome: Progressing Towards Goal  Note: Fall Risk Interventions:       Mentation Interventions: Adequate sleep, hydration, pain control, Bed/chair exit alarm, Door open when patient unattended, Familiar objects from home, Family/sitter at bedside, Increase mobility, More frequent rounding, Reorient patient, Room close to nurse's station, Toileting rounds, Update white board    Medication Interventions: Bed/chair exit alarm, Patient to call before getting OOB, Teach patient to arise slowly, Utilize gait belt for transfers/ambulation    Elimination Interventions: Bed/chair exit alarm, Call light in reach, Patient to call for help with toileting needs    History of Falls Interventions: Bed/chair exit alarm, Consult care management for discharge planning, Door open when patient unattended, Investigate reason for fall, Utilize gait belt for transfer/ambulation, Room close to nurse's station, Assess for delayed presentation/identification of injury for 48 hrs (comment for end date), Vital signs minimum Q4HRs X 24 hrs (comment for end date)         Problem: Patient Education: Go to Patient Education Activity  Goal: Patient/Family Education  Outcome: Progressing Towards Goal     Problem: Discharge Planning  Goal: *Discharge to safe environment  Outcome: Progressing Towards Goal     Problem: Patient Education: Go to Patient Education Activity  Goal: Patient/Family Education  Outcome: Progressing Towards Goal     Problem: Nutrition Deficit  Goal: *Optimize nutritional status  Outcome: Progressing Towards Goal

## 2020-03-02 VITALS
WEIGHT: 104.06 LBS | SYSTOLIC BLOOD PRESSURE: 118 MMHG | TEMPERATURE: 97.9 F | HEART RATE: 99 BPM | DIASTOLIC BLOOD PRESSURE: 82 MMHG | RESPIRATION RATE: 24 BRPM | OXYGEN SATURATION: 93 % | BODY MASS INDEX: 17.32 KG/M2

## 2020-03-02 LAB
ANION GAP SERPL CALC-SCNC: 5 MMOL/L (ref 5–15)
BACTERIA SPEC CULT: ABNORMAL
BUN SERPL-MCNC: 14 MG/DL (ref 6–20)
BUN/CREAT SERPL: 32 (ref 12–20)
CALCIUM SERPL-MCNC: 8.2 MG/DL (ref 8.5–10.1)
CC UR VC: ABNORMAL
CHLORIDE SERPL-SCNC: 100 MMOL/L (ref 97–108)
CO2 SERPL-SCNC: 27 MMOL/L (ref 21–32)
CREAT SERPL-MCNC: 0.44 MG/DL (ref 0.55–1.02)
GLUCOSE SERPL-MCNC: 125 MG/DL (ref 65–100)
POTASSIUM SERPL-SCNC: 3.7 MMOL/L (ref 3.5–5.1)
SERVICE CMNT-IMP: ABNORMAL
SODIUM SERPL-SCNC: 132 MMOL/L (ref 136–145)

## 2020-03-02 PROCEDURE — 74011250636 HC RX REV CODE- 250/636: Performed by: INTERNAL MEDICINE

## 2020-03-02 PROCEDURE — 36415 COLL VENOUS BLD VENIPUNCTURE: CPT

## 2020-03-02 PROCEDURE — 80048 BASIC METABOLIC PNL TOTAL CA: CPT

## 2020-03-02 PROCEDURE — 74011250637 HC RX REV CODE- 250/637: Performed by: INTERNAL MEDICINE

## 2020-03-02 RX ORDER — CEFDINIR 300 MG/1
300 CAPSULE ORAL 2 TIMES DAILY
Qty: 5 CAP | Refills: 0 | Status: SHIPPED | OUTPATIENT
Start: 2020-03-02

## 2020-03-02 RX ADMIN — ASPIRIN 81 MG 81 MG: 81 TABLET ORAL at 08:15

## 2020-03-02 RX ADMIN — Medication 10 ML: at 05:34

## 2020-03-02 RX ADMIN — HEPARIN SODIUM 5000 UNITS: 5000 INJECTION INTRAVENOUS; SUBCUTANEOUS at 01:29

## 2020-03-02 RX ADMIN — CEFDINIR 300 MG: 300 CAPSULE ORAL at 08:20

## 2020-03-02 RX ADMIN — HEPARIN SODIUM 5000 UNITS: 5000 INJECTION INTRAVENOUS; SUBCUTANEOUS at 08:15

## 2020-03-02 RX ADMIN — DOCUSATE SODIUM 100 MG: 100 CAPSULE, LIQUID FILLED ORAL at 08:15

## 2020-03-02 NOTE — PROGRESS NOTES
Problem: Pressure Injury - Risk of  Goal: *Prevention of pressure injury  Description  Document Delbert Scale and appropriate interventions in the flowsheet. Outcome: Progressing Towards Goal  Note: Pressure Injury Interventions:  Sensory Interventions: Assess changes in LOC, Check visual cues for pain, Float heels, Keep linens dry and wrinkle-free, Minimize linen layers, Pressure redistribution bed/mattress (bed type), Turn and reposition approx. every two hours (pillows and wedges if needed)    Moisture Interventions: Absorbent underpads, Check for incontinence Q2 hours and as needed, Limit adult briefs, Minimize layers, Moisture barrier    Activity Interventions: Increase time out of bed, Pressure redistribution bed/mattress(bed type), PT/OT evaluation    Mobility Interventions: Float heels, HOB 30 degrees or less, Pressure redistribution bed/mattress (bed type), PT/OT evaluation, Turn and reposition approx.  every two hours(pillow and wedges)    Nutrition Interventions: Document food/fluid/supplement intake    Friction and Shear Interventions: HOB 30 degrees or less, Lift sheet, Minimize layers                Problem: Patient Education: Go to Patient Education Activity  Goal: Patient/Family Education  Outcome: Progressing Towards Goal     Problem: Patient Education: Go to Patient Education Activity  Goal: Patient/Family Education  Outcome: Progressing Towards Goal     Problem: TIA/CVA Stroke: 0-24 hours  Goal: Off Pathway (Use only if patient is Off Pathway)  Outcome: Progressing Towards Goal  Goal: Activity/Safety  Outcome: Progressing Towards Goal  Goal: Consults, if ordered  Outcome: Progressing Towards Goal  Goal: Diagnostic Test/Procedures  Outcome: Progressing Towards Goal  Goal: Nutrition/Diet  Outcome: Progressing Towards Goal  Goal: Discharge Planning  Outcome: Progressing Towards Goal  Goal: Medications  Outcome: Progressing Towards Goal  Goal: Respiratory  Outcome: Progressing Towards Goal  Goal: Treatments/Interventions/Procedures  Outcome: Progressing Towards Goal  Goal: Minimize risk of bleeding post-thrombolytic infusion  Outcome: Progressing Towards Goal  Goal: Monitor for complications post-thrombolytic infusion  Outcome: Progressing Towards Goal  Goal: Psychosocial  Outcome: Progressing Towards Goal  Goal: *Hemodynamically stable  Outcome: Progressing Towards Goal  Goal: *Neurologically stable  Description  Absence of additional neurological deficits    Outcome: Progressing Towards Goal  Goal: *Verbalizes anxiety and depression are reduced or absent  Outcome: Progressing Towards Goal  Goal: *Absence of Signs of Aspiration on Current Diet  Outcome: Progressing Towards Goal  Goal: *Absence of deep venous thrombosis signs and symptoms(Stroke Metric)  Outcome: Progressing Towards Goal  Goal: *Ability to perform ADLs and demonstrates progressive mobility and function  Outcome: Progressing Towards Goal  Goal: *Stroke education started(Stroke Metric)  Outcome: Progressing Towards Goal  Goal: *Dysphagia screen performed(Stroke Metric)  Outcome: Progressing Towards Goal  Goal: *Rehab consulted(Stroke Metric)  Outcome: Progressing Towards Goal     Problem: TIA/CVA Stroke: Day 2 Until Discharge  Goal: Off Pathway (Use only if patient is Off Pathway)  Outcome: Progressing Towards Goal  Goal: Activity/Safety  Outcome: Progressing Towards Goal  Goal: Diagnostic Test/Procedures  Outcome: Progressing Towards Goal  Goal: Nutrition/Diet  Outcome: Progressing Towards Goal  Goal: Discharge Planning  Outcome: Progressing Towards Goal  Goal: Medications  Outcome: Progressing Towards Goal  Goal: Respiratory  Outcome: Progressing Towards Goal  Goal: Treatments/Interventions/Procedures  Outcome: Progressing Towards Goal  Goal: Psychosocial  Outcome: Progressing Towards Goal  Goal: *Verbalizes anxiety and depression are reduced or absent  Outcome: Progressing Towards Goal  Goal: *Absence of aspiration  Outcome: Progressing Towards Goal  Goal: *Absence of deep venous thrombosis signs and symptoms(Stroke Metric)  Outcome: Progressing Towards Goal  Goal: *Optimal pain control at patient's stated goal  Outcome: Progressing Towards Goal  Goal: *Tolerating diet  Outcome: Progressing Towards Goal  Goal: *Ability to perform ADLs and demonstrates progressive mobility and function  Outcome: Progressing Towards Goal  Goal: *Stroke education continued(Stroke Metric)  Outcome: Progressing Towards Goal     Problem: Ischemic Stroke: Discharge Outcomes  Goal: *Verbalizes anxiety and depression are reduced or absent  Outcome: Progressing Towards Goal  Goal: *Verbalize understanding of risk factor modification(Stroke Metric)  Outcome: Progressing Towards Goal  Goal: *Hemodynamically stable  Outcome: Progressing Towards Goal  Goal: *Absence of aspiration pneumonia  Outcome: Progressing Towards Goal  Goal: *Aware of needed dietary changes  Outcome: Progressing Towards Goal  Goal: *Verbalize understanding of prescribed medications including anti-coagulants, anti-lipid, and/or anti-platelets(Stroke Metric)  Outcome: Progressing Towards Goal  Goal: *Tolerating diet  Outcome: Progressing Towards Goal  Goal: *Aware of follow-up diagnostics related to anticoagulants  Outcome: Progressing Towards Goal  Goal: *Ability to perform ADLs and demonstrates progressive mobility and function  Outcome: Progressing Towards Goal  Goal: *Absence of DVT(Stroke Metric)  Outcome: Progressing Towards Goal  Goal: *Absence of aspiration  Outcome: Progressing Towards Goal  Goal: *Optimal pain control at patient's stated goal  Outcome: Progressing Towards Goal  Goal: *Home safety concerns addressed  Outcome: Progressing Towards Goal  Goal: *Describes available resources and support systems  Outcome: Progressing Towards Goal  Goal: *Verbalizes understanding of activation of EMS(911) for stroke symptoms(Stroke Metric)  Outcome: Progressing Towards Goal  Goal: *Understands and describes signs and symptoms to report to providers(Stroke Metric)  Outcome: Progressing Towards Goal  Goal: *Neurolgocially stable (absence of additional neurological deficits)  Outcome: Progressing Towards Goal  Goal: *Verbalizes importance of follow-up with primary care physician(Stroke Metric)  Outcome: Progressing Towards Goal  Goal: *Smoking cessation discussed,if applicable(Stroke Metric)  Outcome: Progressing Towards Goal  Goal: *Depression screening completed(Stroke Metric)  Outcome: Progressing Towards Goal     Problem: Falls - Risk of  Goal: *Absence of Falls  Description  Document Yvette Alvaro Fall Risk and appropriate interventions in the flowsheet.   Outcome: Progressing Towards Goal  Note: Fall Risk Interventions:  Mobility Interventions: Assess mobility with egress test, Communicate number of staff needed for ambulation/transfer, Bed/chair exit alarm, Patient to call before getting OOB, PT Consult for mobility concerns, PT Consult for assist device competence    Mentation Interventions: Adequate sleep, hydration, pain control, Bed/chair exit alarm, Door open when patient unattended, Evaluate medications/consider consulting pharmacy, Increase mobility, More frequent rounding, Reorient patient, Room close to nurse's station, Toileting rounds, Update white board    Medication Interventions: Bed/chair exit alarm, Evaluate medications/consider consulting pharmacy, Patient to call before getting OOB, Teach patient to arise slowly    Elimination Interventions: Bed/chair exit alarm, Call light in reach, Elevated toilet seat, Patient to call for help with toileting needs, Stay With Me (per policy), Toilet paper/wipes in reach, Toileting schedule/hourly rounds    History of Falls Interventions: Bed/chair exit alarm, Consult care management for discharge planning, Door open when patient unattended, Evaluate medications/consider consulting pharmacy, Investigate reason for fall, Room close to nurse's station Problem: Patient Education: Go to Patient Education Activity  Goal: Patient/Family Education  Outcome: Progressing Towards Goal     Problem: Discharge Planning  Goal: *Discharge to safe environment  Outcome: Progressing Towards Goal     Problem: Patient Education: Go to Patient Education Activity  Goal: Patient/Family Education  Outcome: Progressing Towards Goal     Problem: Nutrition Deficit  Goal: *Optimize nutritional status  Outcome: Progressing Towards Goal

## 2020-03-02 NOTE — PROGRESS NOTES
Pharmacist Discharge Medication Reconciliation    Discharging Provider:     Significant PMH: No past medical history on file. Chief Complaint for this Admission:   Chief Complaint   Patient presents with    Extremity Weakness     last well known 0745. at 0800, pt had difficulty speaking and RUE weakness     Allergies: Patient has no known allergies. Discharge Medications:   Current Discharge Medication List        START taking these medications    Details   cefdinir (OMNICEF) 300 mg capsule Take 1 Cap by mouth two (2) times a day. Qty: 5 Cap, Refills: 0           STOP taking these medications       hydroCHLOROthiazide (HYDRODIURIL) 12.5 mg tablet Comments:   Reason for Stopping:         amLODIPine (NORVASC) 5 mg tablet Comments:   Reason for Stopping:               The patient's chart, MAR and AVS were reviewed by Darinel Jensen.

## 2020-03-02 NOTE — PROGRESS NOTES
Transition of Care Plan     Disposition: Return to [place] [address] at the [facility]: by car with [place], offered BLS but this was declined. [address] Follow up with PCP/Specialist       This CM met with POA and she signed second IM Medicare notice. Placed in chart to be scanned. This CM also spoke with Dr Salma Yan who stated that she will have MD at the [facility] evaluate for hospice services. This CM was not requested to arrange for hospice related services. POA stated that the she would prefer for MD at [facility] to evaluate for hospice services. Gave nurse number for report [phone] and faxed information to [phone]. This CM also spoke with nurse manager for the [facility] who stated that patient can return to [place] [address] at the [facility].     Reuben Dao  11:39 AM

## 2020-03-02 NOTE — PROGRESS NOTES
Problem: Pressure Injury - Risk of  Goal: *Prevention of pressure injury  Description  Document Delbert Scale and appropriate interventions in the flowsheet.   Outcome: Progressing Towards Goal  Note: Pressure Injury Interventions:  Sensory Interventions: Assess changes in LOC, Check visual cues for pain, Keep linens dry and wrinkle-free, Maintain/enhance activity level, Minimize linen layers    Moisture Interventions: Absorbent underpads, Check for incontinence Q2 hours and as needed, Limit adult briefs    Activity Interventions: Assess need for specialty bed, Chair cushion, PT/OT evaluation    Mobility Interventions: Chair cushion, Assess need for specialty bed, HOB 30 degrees or less, PT/OT evaluation    Nutrition Interventions: Document food/fluid/supplement intake    Friction and Shear Interventions: HOB 30 degrees or less, Lift sheet, Minimize layers                Problem: Patient Education: Go to Patient Education Activity  Goal: Patient/Family Education  Outcome: Progressing Towards Goal     Problem: Patient Education: Go to Patient Education Activity  Goal: Patient/Family Education  Outcome: Progressing Towards Goal     Problem: TIA/CVA Stroke: Day 2 Until Discharge  Goal: Off Pathway (Use only if patient is Off Pathway)  Outcome: Progressing Towards Goal  Goal: Activity/Safety  Outcome: Progressing Towards Goal  Goal: Diagnostic Test/Procedures  Outcome: Progressing Towards Goal  Goal: Nutrition/Diet  Outcome: Progressing Towards Goal  Goal: Discharge Planning  Outcome: Progressing Towards Goal  Goal: Medications  Outcome: Progressing Towards Goal  Goal: Respiratory  Outcome: Progressing Towards Goal  Goal: Treatments/Interventions/Procedures  Outcome: Progressing Towards Goal  Goal: Psychosocial  Outcome: Progressing Towards Goal  Goal: *Verbalizes anxiety and depression are reduced or absent  Outcome: Progressing Towards Goal  Goal: *Absence of aspiration  Outcome: Progressing Towards Goal  Goal: *Absence of deep venous thrombosis signs and symptoms(Stroke Metric)  Outcome: Progressing Towards Goal  Goal: *Optimal pain control at patient's stated goal  Outcome: Progressing Towards Goal  Goal: *Tolerating diet  Outcome: Progressing Towards Goal  Goal: *Ability to perform ADLs and demonstrates progressive mobility and function  Outcome: Progressing Towards Goal  Goal: *Stroke education continued(Stroke Metric)  Outcome: Progressing Towards Goal     Problem: Ischemic Stroke: Discharge Outcomes  Goal: *Verbalizes anxiety and depression are reduced or absent  Outcome: Progressing Towards Goal  Goal: *Verbalize understanding of risk factor modification(Stroke Metric)  Outcome: Progressing Towards Goal  Goal: *Hemodynamically stable  Outcome: Progressing Towards Goal  Goal: *Absence of aspiration pneumonia  Outcome: Progressing Towards Goal  Goal: *Aware of needed dietary changes  Outcome: Progressing Towards Goal  Goal: *Verbalize understanding of prescribed medications including anti-coagulants, anti-lipid, and/or anti-platelets(Stroke Metric)  Outcome: Progressing Towards Goal  Goal: *Tolerating diet  Outcome: Progressing Towards Goal  Goal: *Aware of follow-up diagnostics related to anticoagulants  Outcome: Progressing Towards Goal  Goal: *Ability to perform ADLs and demonstrates progressive mobility and function  Outcome: Progressing Towards Goal  Goal: *Absence of DVT(Stroke Metric)  Outcome: Progressing Towards Goal  Goal: *Absence of aspiration  Outcome: Progressing Towards Goal  Goal: *Optimal pain control at patient's stated goal  Outcome: Progressing Towards Goal  Goal: *Home safety concerns addressed  Outcome: Progressing Towards Goal  Goal: *Describes available resources and support systems  Outcome: Progressing Towards Goal  Goal: *Verbalizes understanding of activation of EMS(911) for stroke symptoms(Stroke Metric)  Outcome: Progressing Towards Goal  Goal: *Understands and describes signs and symptoms to report to providers(Stroke Metric)  Outcome: Progressing Towards Goal  Goal: *Neurolgocially stable (absence of additional neurological deficits)  Outcome: Progressing Towards Goal  Goal: *Verbalizes importance of follow-up with primary care physician(Stroke Metric)  Outcome: Progressing Towards Goal  Goal: *Smoking cessation discussed,if applicable(Stroke Metric)  Outcome: Progressing Towards Goal  Goal: *Depression screening completed(Stroke Metric)  Outcome: Progressing Towards Goal     Problem: Falls - Risk of  Goal: *Absence of Falls  Description  Document Soledad Fall Risk and appropriate interventions in the flowsheet.   Outcome: Progressing Towards Goal  Note: Fall Risk Interventions:  Mobility Interventions: Assess mobility with egress test, Communicate number of staff needed for ambulation/transfer, Bed/chair exit alarm, Patient to call before getting OOB, PT Consult for mobility concerns, PT Consult for assist device competence    Mentation Interventions: Bed/chair exit alarm, Door open when patient unattended, Evaluate medications/consider consulting pharmacy    Medication Interventions: Bed/chair exit alarm, Evaluate medications/consider consulting pharmacy, Patient to call before getting OOB, Teach patient to arise slowly    Elimination Interventions: Bed/chair exit alarm, Call light in reach, Patient to call for help with toileting needs    History of Falls Interventions: Bed/chair exit alarm, Consult care management for discharge planning, Door open when patient unattended         Problem: Patient Education: Go to Patient Education Activity  Goal: Patient/Family Education  Outcome: Progressing Towards Goal     Problem: Discharge Planning  Goal: *Discharge to safe environment  Outcome: Progressing Towards Goal     Problem: Patient Education: Go to Patient Education Activity  Goal: Patient/Family Education  Outcome: Progressing Towards Goal     Problem: Nutrition Deficit  Goal: *Optimize nutritional status  Outcome: Progressing Towards Goal     Problem: Patient Education: Go to Patient Education Activity  Goal: Patient/Family Education  Outcome: Progressing Towards Goal     Problem: Patient Education: Go to Patient Education Activity  Goal: Patient/Family Education  Outcome: Progressing Towards Goal

## 2020-03-02 NOTE — CONSULTS
Palliative Medicine    Discussed w attending MD, consult canceled-- attending has already discussed care goals w MPOA

## 2020-03-02 NOTE — PROGRESS NOTES
Stroke Education documented in Patient Education: YES  Core Measures Documented in [facility]:  Risk Factors: YES  Warning signs of stroke: YES  When to Activate 911: YES  Medication Education for Risk Factors: YES  Smoking cessation if applicable: YES  Written Education Given:  YES    Discharge NIH Completed: YES  Score: 6    BRAINS: YES    Follow Up Appointment Made: YES  Date/Time if applicable: 1 week    Bedside RN performed patient education and medication education. Discharge concerns initiated and discussed with patient, including clarification on \"who\" assists the patient at their home and instructions for when the home going patient should call their provider after discharge. Opportunity for questions and clarification was provided. Patient receptive to education: No  Patient stated: Patient did not verbalize understanding. Discharge instructions reviewed with POA. Barriers to Education: AMS  Diagnosis Education given:  YES    Length of stay: 4  Expected Day of Discharge: 3/2/20  Ask if they have \"Help at Home\" & add to white board? YES    Education Day #: 4    Medication Education Given:  YES  M in the box Medication name: Cefdinir    Pt aware of HCAHPS survey: YES    I have reviewed discharge instructions with the patient. The patient verbalized understanding. Patient discharged to memory care unit [place] via POA. Report called to Marky Hernandez.

## 2020-03-02 NOTE — DISCHARGE SUMMARY
Discharge Summary       PATIENT ID: Gutierrez Carmona  MRN: [id number]   YOB: 1945    DATE OF ADMISSION: 2/27/2020 12:58 PM    DATE OF DISCHARGE: 3/2/2020  PRIMARY CARE PROVIDER: Kike Salazar MD     ATTENDING PHYSICIAN: Michelle Stevens DO  DISCHARGING PROVIDER: Larry Light DO    To contact this individual call [phone] and ask the  to page. If unavailable ask to be transferred the Adult Hospitalist Department. CONSULTATIONS: IP CONSULT TO NEUROLOGY    PROCEDURES/SURGERIES: * No surgery found *    ADMITTING DIAGNOSES & HOSPITAL COURSE:     35-year-old female with past medical history dementia presenting to [facility] with fall and altered mental status. Initially concern for stroke and admitted for further evaluation. CT head/MRI head negative for acute CVA. Patient treated for UTI. She returned to her baseline and stable for discharge home. Further discussions held with mPOA, friend and former neighbor of patient. Per mPOA, patient with gradual decline over the past 2 years, more progressive in past 6 months. Discussed hospice and mPOA voiced understanding and overall decline of patient. Will defer to outpatient facility to further address possible hospice. CTA head:  IMPRESSION: No acute abnormality. Negative CTA head and neck    CT head:  IMPRESSION:   1. No acute intracranial abnormality. 2. Stable microvascular ischemic and age-related change. MRI:   IMPRESSION:   Nonspecific white matter changes.  No acute intracranial abnormality    DISCHARGE DIAGNOSES / PLAN:      Acute metabolic encephalopathy: improved to baseline dementia  UTI, E.coli:  Fever:  Metapneumovirus:   -continue cefdinir as outpatient      Progressive dementia  Fall   -Appreciate neurology  -CTA, CT head negative  -MRI negative  -patient resides at memory care unit  -anticipate patient at her baseline  -may be appropriate for hospice, discussed with mPOA     Hyponatremia: monitor off IVFs Hypokalemia: replete as needed  Hypertension: holding home antihypertensives, discontinue on discharge     ADDITIONAL CARE RECOMMENDATIONS:   Medication adjustments:   1. Cefdinir. Take for 2.5 more days for total 5 days  2. Stop blood pressure medications. Please have rechecked as outpatient. PENDING TEST RESULTS:   At the time of discharge the following test results are still pending: none    FOLLOW UP APPOINTMENTS:    Follow-up Information     Follow up With Specialties Details Why Contact Info    Other, MD Lucas    Patient can only remember the practice name and not the physician               DISCHARGE MEDICATIONS:  Discharge Medication List as of 3/2/2020 10:55 AM      START taking these medications    Details   cefdinir (OMNICEF) 300 mg capsule Take 1 Cap by mouth two (2) times a day., Print, Disp-5 Cap, R-0         STOP taking these medications       hydroCHLOROthiazide (HYDRODIURIL) 12.5 mg tablet Comments:   Reason for Stopping:         amLODIPine (NORVASC) 5 mg tablet Comments:   Reason for Stopping:                 NOTIFY YOUR PHYSICIAN FOR ANY OF THE FOLLOWING:   Fever over 101 degrees for 24 hours. Chest pain, shortness of breath, fever, chills, nausea, vomiting, diarrhea, change in mentation, falling, weakness, bleeding. Severe pain or pain not relieved by medications. Or, any other signs or symptoms that you may have questions about.     DISPOSITION:    Home With:   OT  PT  HH  RN      x Long term SNF/Inpatient Rehab    Independent/assisted living    Hospice    Other:       PATIENT CONDITION AT DISCHARGE:     Functional status    Poor    x Deconditioned     Independent      Cognition     Lucid     Forgetful    x Dementia      Catheters/lines (plus indication)    Mo     PICC     PEG    x None      Code status     Full code    x DNR      PHYSICAL EXAMINATION AT DISCHARGE:                    Constitutional:  No acute distress, cooperative, pleasant, confused, alert to name only    ENT: Oral mucosa moist, oropharynx benign. Resp:  CTA bilaterally. No wheezing/rhonchi/rales. No accessory muscle use   CV:  Regular rhythm, normal rate, no murmurs, gallops, rubs    GI:  Soft, non distended, non tender    Musculoskeletal:  No edema, warm, 2+ pulses throughout    Neurologic:  Moves all extremities.                         CHRONIC MEDICAL DIAGNOSES:  Problem List as of 3/2/2020 Never Reviewed          Codes Class Noted - Resolved    Acute CVA (cerebrovascular accident) [facility]) ICD-10-CM: I63.9  ICD-9-CM: 434.91  2/27/2020 - Present        Altered mental status ICD-10-CM: R41.82  ICD-9-CM: 780.97  12/6/2019 - Present              Greater than 30 minutes were spent with the patient on counseling and coordination of care    Signed:   Major Addison DO  3/2/2020  6:40 PM

## 2020-03-02 NOTE — DISCHARGE INSTRUCTIONS
Discharge Instructions       PATIENT ID: Armand Martell  MRN: [id number]   YOB: 1945    DATE OF ADMISSION: 2/27/2020 12:58 PM    DATE OF DISCHARGE: 3/2/2020    PRIMARY CARE PROVIDER: Lucas Monique MD     ATTENDING PHYSICIAN: Ros Pope DO  DISCHARGING PROVIDER: [address], DO    To contact this individual call [phone] and ask the  to page. If unavailable ask to be transferred the Adult Hospitalist Department. DISCHARGE DIAGNOSES     UTI    CONSULTATIONS: IP CONSULT TO NEUROLOGY    PROCEDURES/SURGERIES: * No surgery found *    PENDING TEST RESULTS:   At the time of discharge the following test results are still pending: none    FOLLOW UP APPOINTMENTS:   Follow-up Information     Follow up With Specialties Details Why Contact Info    Lucas Monique MD    Patient can only remember the practice name and not the physician             ADDITIONAL CARE RECOMMENDATIONS:     Medication adjustments:   1. Cefdinir. Take for 2.5 more days for total 5 days  2. Stop blood pressure medications. Please have rechecked as outpatient. DISCHARGE MEDICATIONS:   See Medication Reconciliation Form    · It is important that you take the medication exactly as they are prescribed. · Keep your medication in the bottles provided by the pharmacist and keep a list of the medication names, dosages, and times to be taken in your wallet. · Do not take other medications without consulting your doctor. NOTIFY YOUR PHYSICIAN FOR ANY OF THE FOLLOWING:   Fever over 101 degrees for 24 hours. Chest pain, shortness of breath, fever, chills, nausea, vomiting, diarrhea, change in mentation, falling, weakness, bleeding. Severe pain or pain not relieved by medications. Or, any other signs or symptoms that you may have questions about.         Signed:   [facility],   3/2/2020  10:25 AM

## 2020-03-05 NOTE — CDMP QUERY
Retro Query 1 of 1 Patient admitted with UTI, metabolic encephalopathy and noted BMI under 19.0%. If possible, please document in progress notes and d/c summary if you are evaluating and/or treating any of the following: 
 
=>Underweight 
=>Cachexia 
=>Failure to Thrive 
=>Other explanation of clinical findings =>Clinically Undetermined (no explanation for clinical findings) The medical record reflects the following: 
 
   Risk Factors: dementia;  
 
   Clinical Indicators: BMI  16-17.3%; Alb 3.3 RD Ruiz PN 2/28- \"Pt is small, thin, frail in appearance. Added ONS for trial to assist in trying to meet pt kcal/protein needs. Self-monitoring deficit related to cognitive impairment as evidenced by inability to recognize hunger ques, dementia\"; Treatment: RD consult;  
 
Please clarify and document your clinical opinion in the progress notes and discharge summary, including the definitive and or presumptive diagnosis, (suspected or probable), related to the above clinical findings. Please include clinical findings supporting your diagnosis. Thank you, Kate Argueta, RN, BSN, [facility]  
[facility] 
[phone]

## 2020-04-01 ENCOUNTER — APPOINTMENT (OUTPATIENT)
Dept: CT IMAGING | Age: 75
End: 2020-04-01
Attending: EMERGENCY MEDICINE
Payer: OTHER MISCELLANEOUS

## 2020-04-01 ENCOUNTER — APPOINTMENT (OUTPATIENT)
Dept: GENERAL RADIOLOGY | Age: 75
End: 2020-04-01
Attending: EMERGENCY MEDICINE
Payer: OTHER MISCELLANEOUS

## 2020-04-01 ENCOUNTER — HOSPITAL ENCOUNTER (EMERGENCY)
Age: 75
Discharge: NURSING, REHAB OR LONG-TERM CARE | End: 2020-04-01
Attending: EMERGENCY MEDICINE | Admitting: EMERGENCY MEDICINE
Payer: OTHER MISCELLANEOUS

## 2020-04-01 VITALS
OXYGEN SATURATION: 95 % | HEART RATE: 105 BPM | DIASTOLIC BLOOD PRESSURE: 74 MMHG | SYSTOLIC BLOOD PRESSURE: 166 MMHG | RESPIRATION RATE: 17 BRPM | TEMPERATURE: 98.3 F

## 2020-04-01 DIAGNOSIS — S22.000A COMPRESSION FRACTURE OF BODY OF THORACIC VERTEBRA (HCC): ICD-10-CM
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
## 2020-04-01 DIAGNOSIS — S02.2XXA CLOSED FRACTURE OF NASAL BONE, INITIAL ENCOUNTER: ICD-10-CM

## 2020-04-01 DIAGNOSIS — S09.90XA MINOR HEAD INJURY, INITIAL ENCOUNTER: ICD-10-CM

## 2020-04-01 DIAGNOSIS — S01.01XA LACERATION OF SCALP, INITIAL ENCOUNTER: ICD-10-CM

## 2020-04-01 DIAGNOSIS — M50.30 DEGENERATIVE DISC DISEASE, CERVICAL: ICD-10-CM

## 2020-04-01 DIAGNOSIS — T14.8XXA ABRASION: ICD-10-CM

## 2020-04-01 LAB
ALBUMIN SERPL-MCNC: 3.5 G/DL (ref 3.5–5)
ALBUMIN/GLOB SERPL: 1.1 {RATIO} (ref 1.1–2.2)
ALP SERPL-CCNC: 76 U/L (ref 45–117)
ALT SERPL-CCNC: 26 U/L (ref 12–78)
ANION GAP SERPL CALC-SCNC: 5 MMOL/L (ref 5–15)
APPEARANCE UR: CLEAR
AST SERPL-CCNC: 29 U/L (ref 15–37)
ATRIAL RATE: 86 BPM
BACTERIA URNS QL MICRO: NEGATIVE /HPF
BASOPHILS # BLD: 0 K/UL (ref 0–0.1)
BASOPHILS NFR BLD: 0 % (ref 0–1)
BILIRUB SERPL-MCNC: 0.7 MG/DL (ref 0.2–1)
BILIRUB UR QL: NEGATIVE
BUN SERPL-MCNC: 11 MG/DL (ref 6–20)
BUN/CREAT SERPL: 19 (ref 12–20)
CALCIUM SERPL-MCNC: 9.1 MG/DL (ref 8.5–10.1)
CALCULATED P AXIS, ECG09: 87 DEGREES
CALCULATED R AXIS, ECG10: 93 DEGREES
CALCULATED T AXIS, ECG11: 118 DEGREES
CHLORIDE SERPL-SCNC: 107 MMOL/L (ref 97–108)
CK SERPL-CCNC: 332 U/L (ref 26–192)
CO2 SERPL-SCNC: 27 MMOL/L (ref 21–32)
COLOR UR: ABNORMAL
CREAT SERPL-MCNC: 0.57 MG/DL (ref 0.55–1.02)
DIAGNOSIS, 93000: NORMAL
DIFFERENTIAL METHOD BLD: ABNORMAL
EOSINOPHIL # BLD: 0 K/UL (ref 0–0.4)
EOSINOPHIL NFR BLD: 0 % (ref 0–7)
EPITH CASTS URNS QL MICRO: ABNORMAL /LPF
ERYTHROCYTE [DISTWIDTH] IN BLOOD BY AUTOMATED COUNT: 13 % (ref 11.5–14.5)
GLOBULIN SER CALC-MCNC: 3.3 G/DL (ref 2–4)
GLUCOSE SERPL-MCNC: 104 MG/DL (ref 65–100)
GLUCOSE UR STRIP.AUTO-MCNC: NEGATIVE MG/DL
HCT VFR BLD AUTO: 39.1 % (ref 35–47)
HGB BLD-MCNC: 12.7 G/DL (ref 11.5–16)
HGB UR QL STRIP: ABNORMAL
HYALINE CASTS URNS QL MICRO: ABNORMAL /LPF (ref 0–5)
IMM GRANULOCYTES # BLD AUTO: 0.1 K/UL (ref 0–0.04)
IMM GRANULOCYTES NFR BLD AUTO: 0 % (ref 0–0.5)
INR PPP: 1.1 (ref 0.9–1.1)
KETONES UR QL STRIP.AUTO: ABNORMAL MG/DL
LEUKOCYTE ESTERASE UR QL STRIP.AUTO: ABNORMAL
LYMPHOCYTES # BLD: 1.3 K/UL (ref 0.8–3.5)
LYMPHOCYTES NFR BLD: 10 % (ref 12–49)
MCH RBC QN AUTO: 31 PG (ref 26–34)
MCHC RBC AUTO-ENTMCNC: 32.5 G/DL (ref 30–36.5)
MCV RBC AUTO: 95.4 FL (ref 80–99)
MONOCYTES # BLD: 0.7 K/UL (ref 0–1)
MONOCYTES NFR BLD: 6 % (ref 5–13)
NEUTS SEG # BLD: 10.7 K/UL (ref 1.8–8)
NEUTS SEG NFR BLD: 84 % (ref 32–75)
NITRITE UR QL STRIP.AUTO: NEGATIVE
NRBC # BLD: 0 K/UL (ref 0–0.01)
NRBC BLD-RTO: 0 PER 100 WBC
P-R INTERVAL, ECG05: 110 MS
PH UR STRIP: 7 [PH] (ref 5–8)
PLATELET # BLD AUTO: 255 K/UL (ref 150–400)
PMV BLD AUTO: 10 FL (ref 8.9–12.9)
POTASSIUM SERPL-SCNC: 3.4 MMOL/L (ref 3.5–5.1)
PROT SERPL-MCNC: 6.8 G/DL (ref 6.4–8.2)
PROT UR STRIP-MCNC: NEGATIVE MG/DL
PROTHROMBIN TIME: 11.3 SEC (ref 9–11.1)
Q-T INTERVAL, ECG07: 376 MS
QRS DURATION, ECG06: 76 MS
QTC CALCULATION (BEZET), ECG08: 449 MS
RBC # BLD AUTO: 4.1 M/UL (ref 3.8–5.2)
RBC #/AREA URNS HPF: ABNORMAL /HPF (ref 0–5)
SODIUM SERPL-SCNC: 139 MMOL/L (ref 136–145)
SP GR UR REFRACTOMETRY: 1.01 (ref 1–1.03)
TROPONIN I SERPL-MCNC: <0.05 NG/ML
UR CULT HOLD, URHOLD: NORMAL
UROBILINOGEN UR QL STRIP.AUTO: 0.2 EU/DL (ref 0.2–1)
VENTRICULAR RATE, ECG03: 86 BPM
WBC # BLD AUTO: 12.8 K/UL (ref 3.6–11)
WBC URNS QL MICRO: ABNORMAL /HPF (ref 0–4)

## 2020-04-01 PROCEDURE — 82550 ASSAY OF CK (CPK): CPT

## 2020-04-01 PROCEDURE — 87086 URINE CULTURE/COLONY COUNT: CPT

## 2020-04-01 PROCEDURE — 72072 X-RAY EXAM THORAC SPINE 3VWS: CPT

## 2020-04-01 PROCEDURE — 90715 TDAP VACCINE 7 YRS/> IM: CPT | Performed by: EMERGENCY MEDICINE

## 2020-04-01 PROCEDURE — 70450 CT HEAD/BRAIN W/O DYE: CPT

## 2020-04-01 PROCEDURE — 72125 CT NECK SPINE W/O DYE: CPT

## 2020-04-01 PROCEDURE — 90471 IMMUNIZATION ADMIN: CPT

## 2020-04-01 PROCEDURE — 93005 ELECTROCARDIOGRAM TRACING: CPT

## 2020-04-01 PROCEDURE — 74011250636 HC RX REV CODE- 250/636: Performed by: EMERGENCY MEDICINE

## 2020-04-01 PROCEDURE — 80053 COMPREHEN METABOLIC PANEL: CPT

## 2020-04-01 PROCEDURE — 74011000250 HC RX REV CODE- 250: Performed by: EMERGENCY MEDICINE

## 2020-04-01 PROCEDURE — 85610 PROTHROMBIN TIME: CPT

## 2020-04-01 PROCEDURE — 36415 COLL VENOUS BLD VENIPUNCTURE: CPT

## 2020-04-01 PROCEDURE — 70486 CT MAXILLOFACIAL W/O DYE: CPT

## 2020-04-01 PROCEDURE — 99285 EMERGENCY DEPT VISIT HI MDM: CPT

## 2020-04-01 PROCEDURE — 81001 URINALYSIS AUTO W/SCOPE: CPT

## 2020-04-01 PROCEDURE — 75810000293 HC SIMP/SUPERF WND  RPR

## 2020-04-01 PROCEDURE — 85025 COMPLETE CBC W/AUTO DIFF WBC: CPT

## 2020-04-01 PROCEDURE — 84484 ASSAY OF TROPONIN QUANT: CPT

## 2020-04-01 RX ORDER — BACITRACIN 500 UNIT/G
1 PACKET (EA) TOPICAL
Status: COMPLETED | OUTPATIENT
Start: 2020-04-01 | End: 2020-04-01

## 2020-04-01 RX ORDER — LIDOCAINE HYDROCHLORIDE AND EPINEPHRINE 10; 10 MG/ML; UG/ML
1.5 INJECTION, SOLUTION INFILTRATION; PERINEURAL ONCE
Status: COMPLETED | OUTPATIENT
Start: 2020-04-01 | End: 2020-04-01

## 2020-04-01 RX ADMIN — LIDOCAINE HYDROCHLORIDE,EPINEPHRINE BITARTRATE 15 MG: 10; .01 INJECTION, SOLUTION INFILTRATION; PERINEURAL at 08:38

## 2020-04-01 RX ADMIN — BACITRACIN 1 PACKET: 500 OINTMENT TOPICAL at 09:48

## 2020-04-01 RX ADMIN — TETANUS TOXOID, REDUCED DIPHTHERIA TOXOID AND ACELLULAR PERTUSSIS VACCINE, ADSORBED 0.5 ML: 5; 2.5; 8; 8; 2.5 SUSPENSION INTRAMUSCULAR at 09:48

## 2020-04-01 NOTE — ED PROVIDER NOTES
Please note that this dictation was completed with [employer], the computer voice recognition software.  Quite often unanticipated grammatical, syntax, homophones, and other interpretive errors are inadvertently transcribed by the computer software.  Please disregard these errors.  Please excuse any errors that have escaped final proofreading. Patient is a 72-year-old elderly white female who presents the ED from [address] after an unwitnessed fall. She sustained a laceration to the frontal scalp and bruising to the bridge of the nose. She has a history of dementia and previous CVA. Kianna  She has full passive range of motion of her upper and lower extremities. Staff deny any fever, difficulty breathing, difficulty swallowing, SOB or chest pain. Denies any vomiting or diarrhea. PMH, social history and ROS are limited secondary to pt's dementia. No past medical history on file. No past surgical history on file. No family history on file.     Social History     Socioeconomic History    Marital status:      Spouse name: Not on file    Number of children: Not on file    Years of education: Not on file    Highest education level: Not on file   Occupational History    Not on file   Social Needs    Financial resource strain: Not on file    Food insecurity     Worry: Not on file     Inability: Not on file    Transportation needs     Medical: Not on file     Non-medical: Not on file   Tobacco Use    Smoking status: Not on file   Substance and Sexual Activity    Alcohol use: Not on file    Drug use: Not on file    Sexual activity: Not on file   Lifestyle    Physical activity     Days per week: Not on file     Minutes per session: Not on file    Stress: Not on file   Relationships    Social connections     Talks on phone: Not on file     Gets together: Not on file     Attends [employer] service: Not on file     Active member of club or organization: Not on file     Attends meetings of clubs or organizations: Not on file     Relationship status: Not on file    Intimate partner violence     Fear of current or ex partner: Not on file     Emotionally abused: Not on file     Physically abused: Not on file     Forced sexual activity: Not on file   Other Topics Concern    Not on file   Social History Narrative    Not on file         ALLERGIES: Patient has no known allergies. Review of Systems   Unable to perform ROS: Dementia   Constitutional: Negative for fever and unexpected weight change. HENT: Positive for facial swelling. Negative for trouble swallowing. Skin: Positive for wound. All other systems reviewed and are negative. There were no vitals filed for this visit. Physical Exam  Vitals signs and nursing note reviewed. Constitutional:       General: She is not in acute distress. Appearance: She is not ill-appearing or diaphoretic. Comments: Elderly white female; lives in nursing home facility   HENT:      Head:      Comments: 5cm jagged Central frontal scalp laceration;bleeding is controlled. There is no obvious bony deformity. Good neurovascular sensation. Right Ear: Tympanic membrane normal.      Left Ear: Tympanic membrane normal.      Nose: No congestion or rhinorrhea. Comments: Bruising and swelling noted at the bridge of the nose; no active bleeding     Mouth/Throat:      Mouth: Mucous membranes are moist.      Comments: No obvious dental trauma  Neck:      Musculoskeletal: Normal range of motion and neck supple. Cardiovascular:      Rate and Rhythm: Normal rate and regular rhythm. Pulmonary:      Effort: Pulmonary effort is normal.      Breath sounds: Normal breath sounds. Abdominal:      General: Bowel sounds are normal.      Palpations: Abdomen is soft. Tenderness: There is no abdominal tenderness. There is no guarding or rebound. Musculoskeletal: Normal range of motion.       Comments: Abrasion mid/upper thoracic area abrasion Skin:     General: Skin is warm. Neurological:      General: No focal deficit present. Mental Status: She is alert and oriented to person, place, and time. Psychiatric:         Mood and Affect: Mood normal.         Behavior: Behavior normal.          MDM       Wound Repair  Date/Time: 4/1/2020 9:13 AM  Performed by: NPSupervising provider: Dr. Elia Contreras  Preparation: skin prepped with Shur-Clens  Pre-procedure re-eval: Immediately prior to the procedure, the patient was reevaluated and found suitable for the planned procedure and any planned medications. Location details: scalp (central frontal laceration)  Wound length:2.6 - 7.5 cm (5cm jagged frontal scalp laceration)  Anesthesia: local infiltration    Anesthesia:  Local Anesthetic: lidocaine 1% with epinephrine  Foreign bodies: no foreign bodies  Irrigation solution: saline  Irrigation method: syringe  Debridement: minimal  Skin closure: Prolene  Number of sutures: 6  Technique: interrupted  Approximation: close  Dressing: antibiotic ointment  Patient tolerance: Patient tolerated the procedure well with no immediate complications  My total time at bedside, performing this procedure was 16-30 minutes. Comments: Wound care and minor head injury instructions were reviewed; good neurovascular sensation before and after the wound care procedure. Yue Shaffer NP        ED EKG interpretation:  Rhythm: normal sinus rhythm; and regular . Rate (approx.): 86; Axis: normal; P wave: normal; QRS interval: normal ; ST/T wave: non-specific changes; in  Lead: ; Other findings: lots of artifact. This EKG was interpreted by Lolita Collado MD,ED Provider. Pt has been re-examined and is resting comfortably. Dr. Elia Contreras examined the patient and discussed plan of care. Care management was consulted to make arrangements for patient to be returned to [facility]. No new prescriptions were written.     11:11 AM  Patient's results and plan of care have been reviewed with the pt. Patient and/or nursing home facility have verbally conveyed their understanding and agreement of the patient's signs, symptoms, diagnosis, treatment and prognosis and additionally agree to follow up as recommended or return to the Emergency Room should her condition change prior to follow-up. Discharge instructions have also been provided to the patient and nursing home facility with some educational information regarding her diagnosis as well a list of reasons why she would want to return to the ER prior to her follow-up appointment should her condition change. Yue Shaffer NP

## 2020-04-01 NOTE — DISCHARGE INSTRUCTIONS
Patient Education        Cervical Disc Disease: Care Instructions  Your Care Instructions    Cervical disc disease results from damage, disease, or wear and tear to the discs between the bones (vertebra) in your neck. The discs act as shock absorbers for the spine and keep the spine flexible. When a disc is damaged, it can bulge out and press against the nerve roots or spinal cord. This is sometimes called a herniated or \"slipped disc. \" This pressure can cause pain and numbness or tingling in your arms and hands. It can also cause weakness in your legs. An accident can damage a disc and cause it to break open (rupture). Aging and hard physical work can also cause damage to cervical discs. The first treatments for cervical disc disease include physical therapy, special neck exercises, heat, and pain medicine. If these fail, your doctor may inject steroids and pain medicine into your neck. Surgery is usually done only if other treatments have not worked. Follow-up care is a key part of your treatment and safety. Be sure to make and go to all appointments, and call your doctor if you are having problems. It's also a good idea to know your test results and keep a list of the medicines you take. How can you care for yourself at home? · Take pain medicines exactly as directed. ? If the doctor gave you a prescription medicine for pain, take it as prescribed. ? If you are not taking a prescription pain medicine, ask your doctor if you can take an over-the-counter medicine. · Don't spend too long in one position. Take short breaks to move around and change positions. · Wear a seat belt and shoulder harness when you are in a car. · Sleep with a pillow under your head and neck that keeps your neck straight. · Follow your doctor's instructions for gentle neck-stretching exercises. · Do not smoke. Smoking can slow healing of your discs.  If you need help quitting, talk to your doctor about stop-smoking programs and medicines. These can increase your chances of quitting for good. · Avoid strenuous work or exercise until your doctor says it is okay. When should you call for help? Call 911 anytime you think you may need emergency care. For example, call if:    · You are unable to move an arm or a leg at all.   [facility] your doctor now or seek immediate medical care if:    · You have new or worse symptoms in your arms, legs, belly, or buttocks. Symptoms may include:  ? Numbness or tingling. ? Weakness. ? Pain.     · You lose bladder or bowel control.    Watch closely for changes in your health, and be sure to contact your doctor if:    · You do not get better as expected. Where can you learn more? Go to http://bibiiodinejo.info/  Enter N118 in the search box to learn more about \"Cervical Disc Disease: Care Instructions. \"  Current as of: June 26, 2019Content Version: 12.4  © 9893-8358 [employer]. Care instructions adapted under license by [employer] (which disclaims liability or warranty for this information). If you have questions about a medical condition or this instruction, always ask your healthcare professional. [place] [address] any warranty or liability for your use of this information. Patient Education        Facial Fracture: Care Instructions  Your Care Instructions  You have broken (fractured) one or more bones in your face. Swelling and bruising from the injury are likely to get worse over the first couple of days. After that, the swelling should steadily improve until it is gone. If you have bruises on your face, they may change as they heal. The skin may turn from black and blue to green to yellow or brown before it returns to its normal color. It may take several weeks for your injury to heal.  It is very important that you get follow-up care as directed so that the injury heals properly and does not lead to problems.  The kind of care and treatment you will need depends on the specific type of break (or breaks) you have. You heal best when you take good care of yourself. Eat a variety of healthy foods, and don't smoke. Follow-up care is a key part of your treatment and safety. Be sure to make and go to all appointments, and call your doctor if you are having problems. It's also a good idea to know your test results and keep a list of the medicines you take. How can you care for yourself at home? · Put ice or a cold pack on your injury for 10 to 20 minutes at a time. Try to do this every 1 to 2 hours for the next 3 days (when you are awake) or until the swelling goes down. Put a thin cloth between the ice pack and your skin. · Go to all follow-up appointments with your doctor. Your doctor will determine whether you need further treatment, including surgery. · Take your medicines exactly as prescribed. Call your doctor if you think you are having a problem with your medicine. You will get more details on the specific medicines your doctor prescribes. · If your doctor prescribed antibiotics, take them exactly as directed. Do not stop taking them just because you feel better. You need to take the full course of antibiotics. · Be safe with medicines. Read and follow all instructions on the label. ? If the doctor gave you a prescription medicine for pain, take it as prescribed. ? If you are not taking a prescription pain medicine, ask your doctor if you can take an over-the-counter medicine. · Keep your head elevated when you sleep. · Eat soft food to decrease jaw pain. · Do not blow your nose. Dab it with a tissue if you need to. When should you call for help? Call 911 anytime you think you may need emergency care.  For example, call if:    · You have a seizure.     · You passed out (lost consciousness).     · You have tingling, weakness, or numbness on one side of your body.    Call your doctor now or seek immediate medical care if:    · You have a severe headache.     · You develop double vision.     · You have a fever and stiff neck.     · Clear, watery fluid drains from your nose.     · You feel dizzy or lightheaded.     · You have new eye pain or changes in your vision, such as blurring.     · You have new ear pain, ringing in your ears, or trouble hearing.     · You are confused, irritable, or not acting normally.     · You have a hard time standing, walking, or talking.     · You have new mouth or tooth pain, or you have trouble chewing.     · You have increasing pain even after you have taken your pain medicine.    Watch closely for changes in your health, and be sure to contact your doctor if:    · You develop a cough, cold, or sinus infection.     · The symptoms from your injury are not steadily improving. Where can you learn more? Go to http://bibi-jo.info/  Enter M349 in the search box to learn more about \"Facial Fracture: Care Instructions. \"  Current as of: November 19, 2019Content Version: 12.4  © 3353-7083 [employer]. Care instructions adapted under license by [employer] (which disclaims liability or warranty for this information). If you have questions about a medical condition or this instruction, always ask your healthcare professional. [address] any warranty or liability for your use of this information. Patient Education        Learning About a Closed Head Injury  What is a closed head injury? A closed head injury happens when your head gets hit hard. The strong force of the blow causes your brain to shake in your skull. This movement can cause the brain to bruise, swell, or tear. Sometimes nerves or blood vessels also get damaged. This can cause bleeding in or around the brain. A concussion is a type of closed head injury. What are the symptoms? If you have a mild concussion, you may have a mild headache or feel \"not quite right. \" These symptoms are common. They usually go away over a few days to 4 weeks. But sometimes after a concussion, you feel like you can't function as well as before the injury. And you have new symptoms. This is called postconcussive syndrome. You may:  · Find it harder to solve problems, think, concentrate, or remember. · Have headaches. · Have changes in your sleep patterns, such as not being able to sleep or sleeping all the time. · Have changes in your personality. · Not be interested in your usual activities. · Feel angry or anxious without a clear reason. · Lose your sense of taste or smell. · Be dizzy, lightheaded, or unsteady. It may be hard to stand or walk. How is a closed head injury treated? Any person who may have a concussion needs to see a doctor. Some people have to stay in the hospital to be watched. Others can go home safely. If you go home, follow your doctor's instructions. He or she will tell you if you need someone to watch you closely for the next 24 hours or longer. Rest is the best treatment. Get plenty of sleep at night. And try to rest during the day. · Avoid activities that are physically or mentally demanding. These include housework, exercise, and schoolwork. And don't play video games, send text messages, or use the computer. You may need to change your school or work schedule to be able to avoid these activities. · Ask your doctor when it's okay to drive, ride a bike, or operate machinery. · Take an over-the-counter pain medicine, such as acetaminophen (Tylenol), ibuprofen (Advil, Motrin), or naproxen (Aleve). Be safe with medicines. Read and follow all instructions on the label. · Check with your doctor before you use any other medicines for pain. · Do not drink alcohol or use illegal drugs. They can slow recovery. They can also increase your risk of getting a second head injury. Follow-up care is a key part of your treatment and safety.  Be sure to make and go to all appointments, and call your doctor if you are having problems. It's also a good idea to know your test results and keep a list of the medicines you take. Where can you learn more? Go to http://bibi-jo.info/  Enter E235 in the search box to learn more about \"Learning About a Closed Head Injury. \"  Current as of: November 19, 2019Content Version: 12.4  © 3698-1405 [employer]. Care instructions adapted under license by [employer] (which disclaims liability or warranty for this information). If you have questions about a medical condition or this instruction, always ask your healthcare professional. [address] any warranty or liability for your use of this information. Patient Education        Cuts: Care Instructions  Your Care Instructions  A cut can happen anywhere on your body. Stitches, staples, skin adhesives, or pieces of tape called Steri-Strips are sometimes used to keep the edges of a cut together and help it heal. Steri-Strips can be used by themselves or with stitches or staples. Sometimes cuts are left open. If the cut went deep and through the skin, the doctor may have closed the cut in two layers. A deeper layer of stitches brings the deep part of the cut together. These stitches will dissolve and don't need to be removed. The upper layer closure, which could be stitches, staples, Steri-Strips, or adhesive, is what you see on the cut. A cut is often covered by a bandage. The doctor has checked you carefully, but problems can develop later. If you notice any problems or new symptoms, get medical treatment right away. Follow-up care is a key part of your treatment and safety. Be sure to make and go to all appointments, and call your doctor if you are having problems. It's also a good idea to know your test results and keep a list of the medicines you take. How can you care for yourself at home?   If a cut is open or closed  · Prop up the sore area on a pillow anytime you sit or lie down during the next 3 days. Try to keep it above the level of your heart. This will help reduce swelling. · Keep the cut dry for the first 24 to 48 hours. After this, you can shower if your doctor okays it. Pat the cut dry. · Don't soak the cut, such as in a bathtub. Your doctor will tell you when it's safe to get the cut wet. · After the first 24 to 48 hours, clean the cut with soap and water 2 times a day unless your doctor gives you different instructions. ? Don't use hydrogen peroxide or alcohol, which can slow healing. ? You may cover the cut with a thin layer of petroleum jelly and a nonstick bandage. ? If the doctor put a bandage over the cut, put on a new bandage after cleaning the cut or if the bandage gets wet or dirty. · Avoid any activity that could cause your cut to reopen. · Be safe with medicines. Read and follow all instructions on the label. ? If the doctor gave you a prescription medicine for pain, take it as prescribed. ? If you are not taking a prescription pain medicine, ask your doctor if you can take an over-the-counter medicine. If the cut is closed with stitches, staples, or Steri-Strips  · Follow the above instructions for open or closed cuts. · Do not remove the stitches or staples on your own. Your doctor will tell you when to come back to have the stitches or staples removed. · Leave Steri-Strips on until they fall off. If the cut is closed with a skin adhesive  · Follow the above instructions for open or closed cuts. · Leave the skin adhesive on your skin until it falls off on its own. This may take 5 to 10 days. · Do not scratch, rub, or pick at the adhesive. · Do not put the sticky part of a bandage directly on the adhesive. · Do not put any kind of ointment, cream, or lotion over the area. This can make the adhesive fall off too soon.  Do not use hydrogen peroxide or alcohol, which can slow healing. When should you call for help? Call your doctor now or seek immediate medical care if:    · You have new pain, or your pain gets worse.     · The skin near the cut is cold or pale or changes color.     · You have tingling, weakness, or numbness near the cut.     · The cut starts to bleed, and blood soaks through the bandage. Oozing small amounts of blood is normal.     · You have trouble moving the area near the cut.     · You have symptoms of infection, such as:  ? Increased pain, swelling, warmth, or redness around the cut.  ? Red streaks leading from the cut.  ? Pus draining from the cut.  ? A fever.    Watch closely for changes in your health, and be sure to contact your doctor if:    · The cut reopens.     · You do not get better as expected. Where can you learn more? Go to http://bibi-jo.info/  Enter M735 in the search box to learn more about \"Cuts: Care Instructions. \"  Current as of: June 26, 2019Content Version: 12.4  © 9874-7557 [employer]. Care instructions adapted under license by [employer] (which disclaims liability or warranty for this information). If you have questions about a medical condition or this instruction, always ask your healthcare professional. [address] any warranty or liability for your use of this information. Patient Education        Broken Nose: Care Instructions  Your Care Instructions    A broken nose is a break, or fracture, of the bone or cartilage. Most broken noses need only home care and a follow-up visit with a doctor. The swelling should go down in a few days. Bruises around your eyes and nose should go away in 2 to 3 weeks. You heal best when you take good care of yourself. Eat a variety of healthy foods, and don't smoke. Follow-up care is a key part of your treatment and safety. Be sure to make and go to all appointments, and call your doctor if you are having problems.  It's also a good idea to know your test results and keep a list of the medicines you take. How can you care for yourself at home? · If you have a nasal splint or packing, leave it in place until a doctor removes it. · If your doctor prescribed antibiotics, take them as directed. Do not stop taking them just because you feel better. You need to take the full course of antibiotics. · Take decongestants as directed to help you breathe after the splint or packing is removed. Your doctor may give you a prescription or suggest over-the-counter medicine. · Be safe with medicines. Take pain medicines exactly as directed. ? If the doctor gave you a prescription medicine for pain, take it as prescribed. ? If you are not taking a prescription pain medicine, ask your doctor if you can take an over-the-counter medicine. · Put ice or a cold pack on your nose for 10 to 20 minutes at a time. Try to do this every 1 to 2 hours for the first 3 days (when you are awake) or until the swelling goes down. Put a thin cloth between the ice pack and your skin. · Sleep with your head slightly raised until the swelling goes down. Prop up your head and shoulders on pillows. · Do not play contact sports for 6 weeks. When should you call for help? Call 911 anytime you think you may need emergency care. For example, call if:    · You have trouble breathing.     · You passed out (lost consciousness).    Call your doctor now or seek immediate medical care if:    · You have signs of infection, such as:  ? Increased pain, swelling, warmth, or redness. ? Red streaks leading from the area. ? Pus draining from the area. ? A fever.     · You have clear fluid draining from your nose.     · You have vision changes.     · Your nose is bleeding.     · You have new or worse pain.    Watch closely for changes in your health, and be sure to contact your doctor if:    · You do not get better as expected. Where can you learn more?   Go to http://bibi-jo.info/  Enter Y345 in the search box to learn more about \"Broken Nose: Care Instructions. \"  Current as of: June 26, 2019Content Version: 12.4  © 8927-8642 [employer]. Care instructions adapted under license by [employer] (which disclaims liability or warranty for this information). If you have questions about a medical condition or this instruction, always ask your healthcare professional. [place] [address] any warranty or liability for your use of this information. Patient Education        Compression Fracture of the Spine: Care Instructions  Your Care Instructions    A compression fracture happens when the front part of a spinal bone breaks and collapses. A fall or other accident can cause it. A minor injury or moving the wrong way can cause a break if you have thin or brittle bones (osteoporosis). These types of breaks will heal in 8 to 10 weeks. You will need rest and pain medicines. Your doctor may recommend physical therapy. Some doctors recommend that certain people with compression fractures wear braces. Your doctor also may treat thin or brittle bones. You may need surgery if you have lasting pain or if the bone presses on the spinal cord or nerves. You heal best when you take good care of yourself. Eat a variety of healthy foods, and don't smoke. Follow-up care is a key part of your treatment and safety. Be sure to make and go to all appointments, and call your doctor if you are having problems. It's also a good idea to know your test results and keep a list of the medicines you take. How can you care for yourself at home? · Be safe with medicines. Read and follow all instructions on the label. ? If the doctor gave you a prescription medicine for pain, take it as prescribed. ? If you are not taking a prescription pain medicine, ask your doctor if you can take an over-the-counter medicine.   · Talk to your doctor about how to make your bones stronger. You may need medicines or a change in what you eat. · Be active only as directed by your doctor. When should you call for help? Call 911 anytime you think you may need emergency care. For example, call if:    · You are unable to move an arm or a leg at all.   [facility] your doctor now or seek immediate medical care if:    · You have new or worse symptoms in your arms, legs, belly, or buttocks. Symptoms may include:  ? Numbness or tingling. ? Weakness. ? Pain.     · You lose bladder or bowel control.     · You have belly pain, bloating, vomiting, or nausea.    Watch closely for changes in your health, and be sure to contact your doctor if:    · You do not get better as expected. Where can you learn more? Go to http://bibi-jo.info/  Enter P445 in the search box to learn more about \"Compression Fracture of the Spine: Care Instructions. \"  Current as of: June 26, 2019Content Version: 12.4  © 4236-6174 [employer]. Care instructions adapted under license by [employer] (which disclaims liability or warranty for this information). If you have questions about a medical condition or this instruction, always ask your healthcare professional. [address] any warranty or liability for your use of this information.

## 2020-04-01 NOTE — ED TRIAGE NOTES
Pt arrives via EMS from [place] with CC of unwittnessed fall out of bed, staff unsure how long she was down. She was found bleeding from her head. Per EMS staff stated it \"was spurting from her head\", per EMS bleeding was controlled by their arrival.    EMS states swelling to her face has doubled in size since she has been with them. Pt has history of dementia, alert to  Self. Staff found the patient at [phone], last know well was bedtime last night.     Facility states pt is not on blood thinners

## 2020-04-01 NOTE — PROGRESS NOTES
Date of previous inpatient admission/ ED visit? Last IP admission was 02/27/2020-03/02/2020 for acute CVA. Pt RRAT score is 11- LOW. What brought the patient back to ED? Fall    Did patient decline recommended services during last admission/ ED visit (if yes, what)? No    Has patient seen a provider since their last inpatient admission/ED visit (if yes, when)? Yes, POA reports pt is seen by MD on Mondays. PCP: First and Last name: PCP provided through [facility]. Name of Practice:    Are you a current patient: Yes/No: Yes   Approximate date of last visit: 03/30/2020    CM Interventions:  From previous inpatient admission/ED visit: No CM needs noted at discharge. From current inpatient admission/ED visit: Pt being evaluated in the ED at this time. Disposition needs TBD/subject to change pending care recommendations. CM will assist with disposition needs as they arise. CM contacted pt's Tricia Carlin (#: [phone]), to discuss CM role and to assess pt needs. CM verified demographics including insurance and emergency contact information. Pt lives at [facility]. POA reports no concerns for discharge at this time. CM to follow and assist with disposition needs as they arise. Care Management Interventions  PCP Verified by CM: Yes  Last Visit to PCP: 03/30/20  Palliative Care Criteria Met (RRAT>21 & CHF Dx)?: No  Mode of Transport at Discharge: Other (see comment)  Transition of Care Consult (CM Consult):  Other(Readmission Assessment)  MyChart Signup: No  Discharge Durable Medical Equipment: No  Physical Therapy Consult: No  Occupational Therapy Consult: No  Speech Therapy Consult: No  Current Support Network: Nursing Facility, Assisted Living  Confirm Follow Up Transport: Other (see comment)  Discharge Location  Discharge Placement: Assisted Living(Disposition TBD/subject to change pending care recommendations)    Jenn Rodriguez RN, BSN  Care Management Department    1159: CM notified that [facility] is unable to provide transportation for pt. CM contacted POA and notified pt ready for discharge. POA reports she is unable to provide transport and is agreeable to BLS transport. CM notified of possible cost associated; POA agreeable. CM to arrange transport with [facility] via [employer]. CM to place PCS Form, Facesheet, and ED Visit Summary on chart for transport.  Rafaela Rodriguez RN, BSN- Care Management

## 2020-04-02 LAB
BACTERIA SPEC CULT: NORMAL
CC UR VC: NORMAL
SERVICE CMNT-IMP: NORMAL

## 2022-03-19 PROBLEM — R41.82 ALTERED MENTAL STATUS: Status: ACTIVE | Noted: 2019-12-06

## 2022-03-19 PROBLEM — I63.9 ACUTE CVA (CEREBROVASCULAR ACCIDENT) (HCC): Status: ACTIVE | Noted: 2020-02-27

## 2023-05-10 NOTE — PROGRESS NOTES
NUTRITION COMPLETE ASSESSMENT    RECOMMENDATIONS:   1. Continue Cardiac diet as tolerated  2. Added ONS for trials- pt poor hx  3. Monitor weight, document PO intake, total feed     Interventions/Plan:   Food/Nutrient Delivery:  General/healthful diet Commercial supplement          Assessment:   Reason for Assessment:     [x]BPA/MST Referral       Diet: Cardiac  Supplements: Ensure compact, Magic cup  Nutritionally Significant Medications: [x] Reviewed & Includes: colace, heparin. Meal Intake:   Patient Vitals for the past 100 hrs:   % Diet Eaten   02/28/20 1305 50 %       Pre-Hospitalization:   Unsure, pt poor hx. No weight hx in chart. Current Hospitalization:   Fluid Restriction:  none  Appetite:  unsure  PO Ability: Total feed Average po intake: 25%  Average supplements intake:  monitor      Subjective:  Spoke with OT staff who state pt didn't like any of the food on current tray. Spoke with RN regarding PO intake, feeding assistance and adding ONS for trial.     Objective:  76 y.o. female admitted with Acute CVA (cerebrovascular accident) ([facility] [address] [I63.9]    Pt has no past medical history on file. Unsure pt past Medical hx. No weight hx in chart. Pt ate 50% of breakfast per report after passing STAND per RN. Pt did not eat anything for lunch, unsure if pt didn't like food or if pt was still full from breakfast tray, as breakfast was late. Pt is small, thin, frail in appearance. Added ONS for trial to assist in trying to meet pt kcal/protein needs. No reported n/v. Unsure last BM. STAND passed. Monitor PO and ONS acceptance.      Patient Active Problem List   Diagnosis Code    Altered mental status R41.82    Acute CVA (cerebrovascular accident) ([facility] [address] I63.9             Estimated Nutrition Needs:   Kcals/day: 1200 Kcals/day(-1400 )  Protein: 48 g(-57g/day)  Fluid:  1400 mL  Based On: Kcal/kg - specify (Comment)  Weight Used: Actual wt(47.9 kg)    Pt expected to meet estimated nutrient needs:  [] Yes     []  No [x] Unable to predict at this time  Nutrition Diagnosis:   1. Self-monitoring deficit related to cognitive impairment as evidenced by inability to recognize hunger ques, dementia. Goals:     Pt will consume >50% of meals and 2 ONS within 3-5 days     Monitoring & Evaluation:    - Total energy intake, Liquid meal replacement   - Weight/weight change   - Food/nutrition knowledge, Behavior    Previous Nutrition Goals Met:   N/A  Previous Recommendations:    N/A    Education & Discharge Needs:   Identified and addressed as needed   Participated in care plan, and/or interdisciplinary rounds    Nutrition Discharge Plan: TBD      Cultural, Taoist and ethnic food preferences identified: None    Skin Integrity: [x]Intact  []Other  Edema: [x]none  []Other  Last BM: PTA? Food Allergies: [x]None []Other  Diet Restrictions: Cultural/Restorationism Preference(s): None     Anthropometrics:     Wt Readings from Last 10 Encounters:   02/28/20 47.9 kg (105 lb 9.6 oz)      Weight Loss Metrics 2/28/2020   Today's Wt 105 lb 9.6 oz      Weight Source: Bed   ,    There is no height or weight on file to calculate BMI.      ,     ,      Labs:    Lab Results   Component Value Date/Time    Sodium 129 (L) 02/28/2020 04:08 AM    Potassium 3.2 (L) 02/28/2020 04:08 AM    Chloride 97 02/28/2020 04:08 AM    CO2 26 02/28/2020 04:08 AM    Glucose 104 (H) 02/28/2020 04:08 AM    BUN 16 02/28/2020 04:08 AM    Creatinine 0.69 02/28/2020 04:08 AM    Calcium 8.3 (L) 02/28/2020 04:08 AM    Magnesium 2.0 02/28/2020 04:08 AM    Albumin 3.3 (L) 02/28/2020 04:08 AM     No results found for: HBA1C, HGBE8, [id number], [address] [facility]  Pager: [phone] Attending with

## 2023-05-11 RX ORDER — CEFDINIR 300 MG/1
CAPSULE ORAL 2 TIMES DAILY
COMMUNITY
Start: 2020-03-02